# Patient Record
Sex: MALE | Race: WHITE | NOT HISPANIC OR LATINO | Employment: FULL TIME | URBAN - METROPOLITAN AREA
[De-identification: names, ages, dates, MRNs, and addresses within clinical notes are randomized per-mention and may not be internally consistent; named-entity substitution may affect disease eponyms.]

---

## 2021-11-29 ENCOUNTER — TELEPHONE (OUTPATIENT)
Dept: SCHEDULING | Facility: IMAGING CENTER | Age: 46
End: 2021-11-29

## 2022-02-11 ENCOUNTER — OFFICE VISIT (OUTPATIENT)
Dept: MEDICAL GROUP | Facility: PHYSICIAN GROUP | Age: 47
End: 2022-02-11
Payer: COMMERCIAL

## 2022-02-11 VITALS
OXYGEN SATURATION: 98 % | SYSTOLIC BLOOD PRESSURE: 112 MMHG | TEMPERATURE: 98.3 F | WEIGHT: 173 LBS | DIASTOLIC BLOOD PRESSURE: 80 MMHG | HEART RATE: 63 BPM | HEIGHT: 72 IN | RESPIRATION RATE: 18 BRPM | BODY MASS INDEX: 23.43 KG/M2

## 2022-02-11 DIAGNOSIS — Z13.1 ENCOUNTER FOR SCREENING FOR DIABETES MELLITUS: ICD-10-CM

## 2022-02-11 DIAGNOSIS — N18.31 STAGE 3A CHRONIC KIDNEY DISEASE: ICD-10-CM

## 2022-02-11 DIAGNOSIS — M54.50 CHRONIC LOW BACK PAIN, UNSPECIFIED BACK PAIN LATERALITY, UNSPECIFIED WHETHER SCIATICA PRESENT: ICD-10-CM

## 2022-02-11 DIAGNOSIS — F41.1 GAD (GENERALIZED ANXIETY DISORDER): ICD-10-CM

## 2022-02-11 DIAGNOSIS — Z13.6 SCREENING FOR CARDIOVASCULAR CONDITION: ICD-10-CM

## 2022-02-11 DIAGNOSIS — Z13.0 SCREENING FOR DEFICIENCY ANEMIA: ICD-10-CM

## 2022-02-11 DIAGNOSIS — G89.29 CHRONIC LOW BACK PAIN, UNSPECIFIED BACK PAIN LATERALITY, UNSPECIFIED WHETHER SCIATICA PRESENT: ICD-10-CM

## 2022-02-11 PROBLEM — K58.9 IBS (IRRITABLE BOWEL SYNDROME): Status: ACTIVE | Noted: 2022-02-11

## 2022-02-11 PROBLEM — N43.3 HYDROCELE: Status: ACTIVE | Noted: 2022-02-11

## 2022-02-11 PROCEDURE — 99204 OFFICE O/P NEW MOD 45 MIN: CPT | Performed by: INTERNAL MEDICINE

## 2022-02-11 RX ORDER — LORAZEPAM 1 MG/1
1 TABLET ORAL EVERY 4 HOURS PRN
COMMUNITY

## 2022-02-11 ASSESSMENT — ANXIETY QUESTIONNAIRES
7. FEELING AFRAID AS IF SOMETHING AWFUL MIGHT HAPPEN: NEARLY EVERY DAY
5. BEING SO RESTLESS THAT IT IS HARD TO SIT STILL: NEARLY EVERY DAY
4. TROUBLE RELAXING: NEARLY EVERY DAY
3. WORRYING TOO MUCH ABOUT DIFFERENT THINGS: NEARLY EVERY DAY
GAD7 TOTAL SCORE: 21
1. FEELING NERVOUS, ANXIOUS, OR ON EDGE: NEARLY EVERY DAY
6. BECOMING EASILY ANNOYED OR IRRITABLE: NEARLY EVERY DAY
2. NOT BEING ABLE TO STOP OR CONTROL WORRYING: NEARLY EVERY DAY

## 2022-02-11 ASSESSMENT — PATIENT HEALTH QUESTIONNAIRE - PHQ9: CLINICAL INTERPRETATION OF PHQ2 SCORE: 0

## 2022-02-11 NOTE — LETTER
[facility]  Misty Lauren M.D.  [address]  [place] [address]  Fax: [phone]   Authorization for Release/Disclosure of   Protected Health Information   Name: ZAIDA TORREZ : 1975 SSN: [id number]   Address: [id number] [place] [address] [place] [address] Phone:    [phone] (home)    I authorize the entity listed below to release/disclose the PHI below to:   [facility]/Misty Lauren M.D. and Misty Lauren M.D.   Provider or Entity Name:  Anabelle Shi TANIA    Address   City, State, [facility]   Phone:      Fax:     Reason for request: continuity of care   Information to be released:    [  ] LAST COLONOSCOPY,  including any PATH REPORT and follow-up  [  ] LAST FIT/COLOGUARD RESULT [  ] LAST DEXA  [  ] LAST MAMMOGRAM  [  ] LAST PAP  [  ] LAST LABS [  ] RETINA EXAM REPORT  [  ] IMMUNIZATION RECORDS  [xx  ] Release all info      [  ] Check here and initial the line next to each item to release ALL health information INCLUDING  _____ Care and treatment for drug and / or alcohol abuse  _____ HIV testing, infection status, or AIDS  _____ Genetic Testing    DATES OF SERVICE OR TIME PERIOD TO BE DISCLOSED: _____________  I understand and acknowledge that:  * This Authorization may be revoked at any time by you in writing, except if your health information has already been used or disclosed.  * Your health information that will be used or disclosed as a result of you signing this authorization could be re-disclosed by the recipient. If this occurs, your re-disclosed health information may no longer be protected by State or Federal laws.  * You may refuse to sign this Authorization. Your refusal will not affect your ability to obtain treatment.  * This Authorization becomes effective upon signing and will  on (date) __________.      If no date is indicated, this Authorization will  one (1) year from the signature date.    Name: Zaida Torrez    Signature:   Date:     2022       PLEASE  FAX REQUESTED RECORDS BACK TO: [phone]

## 2022-02-11 NOTE — PROGRESS NOTES
Subjective:     CC: Establish care    HISTORY OF THE PRESENT ILLNESS: Patient is a 46 y.o. male. This pleasant patient is here today to establish care and discuss the following issues:    The patient presents to establish care.  He generally feels well.  The patient reports a history of anxiety for which he takes a quarter of his Ativan 1-2 times per week.  He reports chronic back pain that has been improving with changes in his posture at work.  He states he had COVID-19 infection a month ago.  He had a colonoscopy last year, he thinks his next colonoscopy was due in 5 to 7 years.    Allergies: Patient has no allergy information on record.    Current Outpatient Medications Ordered in Epic   Medication Sig Dispense Refill   • LORazepam (ATIVAN) 1 MG Tab Take 1 mg by mouth every four hours as needed for Anxiety. Pt slit 1 MG into quarters       No current Epic-ordered facility-administered medications on file.       History reviewed. No pertinent past medical history.    Past Surgical History:   Procedure Laterality Date   • RHINOPLASTY  2000   • TONSILLECTOMY  1980       Social History     Tobacco Use   • Smoking status: Never Smoker   • Smokeless tobacco: Never Used   Vaping Use   • Vaping Use: Never used   Substance Use Topics   • Alcohol use: Never   • Drug use: Never       Social History     Social History Narrative   • Not on file       Family History   Problem Relation Age of Onset   • Dementia Mother    • Osteoporosis Father    • Hypertension Father        Health Maintenance: Completed    ROS:   Gen: no fevers/chills  Pulm: no sob, no cough  CV: no chest pain, no palpitations  GI: no nausea/vomiting, no diarrhea  Neuro: no headaches, no numbness/tingling      Objective:     Exam: /80   Pulse 63   Temp 36.8 °C (98.3 °F) (Temporal)   Resp 18   Ht 1.829 m (6')   Wt 78.5 kg (173 lb)   SpO2 98%  Body mass index is 23.46 kg/m².    General: Normal appearing. No distress.  HEENT: Normocephalic. Eyes  conjunctiva clear lids without ptosis, pupils equal and reactive to light accommodation, oropharynx is without erythema, edema or exudates.   Pulmonary: Clear to ausculation.  Normal effort. No rales, ronchi, or wheezing.  Cardiovascular: Regular rate and rhythm without murmur.   Abdomen: Soft, nontender, nondistended.   Musculoskeletal: No extremity cyanosis, clubbing, or edema.  Psych: Normal mood and affect. Alert and oriented x3. Judgment and insight is normal.    Assessment & Plan:   46 y.o. male with the following -    LILLIAN (generalized anxiety disorder)  This is a chronic medical condition.  Stable.  The patient takes Ativan 0.25 mg to 0.5 mg 1-2 times per week.  He is not on chronic SSRI therapy.  -Continue lorazepam 0.25 mg to 0.5 mg once or twice weekly  - LORazepam (ATIVAN) 1 MG Tab; Take 1 mg by mouth every four hours as needed for Anxiety. Pt slit 1 MG into quarters    Chronic low back pain, unspecified back pain laterality, unspecified whether sciatica present  This is a chronic medical condition.  Improving.  Patient reports improvement with improvement in his posture at work.    Stage 3a chronic kidney disease (HCC)  This is a chronic medical condition.  No recent labs available for review.  - US-RENAL; Future  - URINE MICROSCOPIC (MICROSCOPIC URINALYSIS); Future  - Cystatin C w/rflx eGFR; Future    Screening for deficiency anemia  - CBC WITH DIFFERENTIAL; Future    Encounter for screening for diabetes mellitus  - Comp Metabolic Panel; Future    Screening for cardiovascular condition  - Lipid Profile; Future      Return in about 1 year (around 2/11/2023) for Annual/Wellness Visit.    Please note that this dictation was created using voice recognition software. I have made every reasonable attempt to correct obvious errors, but I expect that there are errors of grammar and possibly content that I did not discover before finalizing the note.

## 2022-02-16 PROBLEM — N18.31 STAGE 3A CHRONIC KIDNEY DISEASE: Status: ACTIVE | Noted: 2022-02-16

## 2022-03-29 RX ORDER — SERTRALINE HYDROCHLORIDE 25 MG/1
TABLET, FILM COATED ORAL
COMMUNITY
Start: 2022-03-12 | End: 2023-03-15

## 2022-03-29 SDOH — ECONOMIC STABILITY: INCOME INSECURITY: IN THE LAST 12 MONTHS, WAS THERE A TIME WHEN YOU WERE NOT ABLE TO PAY THE MORTGAGE OR RENT ON TIME?: NO

## 2022-03-29 SDOH — ECONOMIC STABILITY: FOOD INSECURITY: WITHIN THE PAST 12 MONTHS, THE FOOD YOU BOUGHT JUST DIDN'T LAST AND YOU DIDN'T HAVE MONEY TO GET MORE.: NEVER TRUE

## 2022-03-29 SDOH — HEALTH STABILITY: PHYSICAL HEALTH: ON AVERAGE, HOW MANY DAYS PER WEEK DO YOU ENGAGE IN MODERATE TO STRENUOUS EXERCISE (LIKE A BRISK WALK)?: 6 DAYS

## 2022-03-29 SDOH — HEALTH STABILITY: PHYSICAL HEALTH: ON AVERAGE, HOW MANY MINUTES DO YOU ENGAGE IN EXERCISE AT THIS LEVEL?: 40 MIN

## 2022-03-29 SDOH — ECONOMIC STABILITY: HOUSING INSECURITY: IN THE LAST 12 MONTHS, HOW MANY PLACES HAVE YOU LIVED?: 1

## 2022-03-29 SDOH — ECONOMIC STABILITY: FOOD INSECURITY: WITHIN THE PAST 12 MONTHS, YOU WORRIED THAT YOUR FOOD WOULD RUN OUT BEFORE YOU GOT MONEY TO BUY MORE.: NEVER TRUE

## 2022-03-29 SDOH — ECONOMIC STABILITY: HOUSING INSECURITY
IN THE LAST 12 MONTHS, WAS THERE A TIME WHEN YOU DID NOT HAVE A STEADY PLACE TO SLEEP OR SLEPT IN A SHELTER (INCLUDING NOW)?: NO

## 2022-03-29 SDOH — ECONOMIC STABILITY: TRANSPORTATION INSECURITY
IN THE PAST 12 MONTHS, HAS THE LACK OF TRANSPORTATION KEPT YOU FROM MEDICAL APPOINTMENTS OR FROM GETTING MEDICATIONS?: NO

## 2022-03-29 SDOH — ECONOMIC STABILITY: TRANSPORTATION INSECURITY
IN THE PAST 12 MONTHS, HAS LACK OF RELIABLE TRANSPORTATION KEPT YOU FROM MEDICAL APPOINTMENTS, MEETINGS, WORK OR FROM GETTING THINGS NEEDED FOR DAILY LIVING?: NO

## 2022-03-29 SDOH — HEALTH STABILITY: MENTAL HEALTH
STRESS IS WHEN SOMEONE FEELS TENSE, NERVOUS, ANXIOUS, OR CAN'T SLEEP AT NIGHT BECAUSE THEIR MIND IS TROUBLED. HOW STRESSED ARE YOU?: TO SOME EXTENT

## 2022-03-29 SDOH — ECONOMIC STABILITY: INCOME INSECURITY: HOW HARD IS IT FOR YOU TO PAY FOR THE VERY BASICS LIKE FOOD, HOUSING, MEDICAL CARE, AND HEATING?: NOT HARD AT ALL

## 2022-03-29 SDOH — ECONOMIC STABILITY: TRANSPORTATION INSECURITY
IN THE PAST 12 MONTHS, HAS LACK OF TRANSPORTATION KEPT YOU FROM MEETINGS, WORK, OR FROM GETTING THINGS NEEDED FOR DAILY LIVING?: NO

## 2022-03-29 ASSESSMENT — SOCIAL DETERMINANTS OF HEALTH (SDOH)
HOW OFTEN DO YOU GET TOGETHER WITH FRIENDS OR RELATIVES?: ONCE A WEEK
HOW HARD IS IT FOR YOU TO PAY FOR THE VERY BASICS LIKE FOOD, HOUSING, MEDICAL CARE, AND HEATING?: NOT HARD AT ALL
HOW MANY DRINKS CONTAINING ALCOHOL DO YOU HAVE ON A TYPICAL DAY WHEN YOU ARE DRINKING: PATIENT DECLINED
HOW OFTEN DO YOU HAVE A DRINK CONTAINING ALCOHOL: NEVER
WITHIN THE PAST 12 MONTHS, YOU WORRIED THAT YOUR FOOD WOULD RUN OUT BEFORE YOU GOT THE MONEY TO BUY MORE: NEVER TRUE
IN A TYPICAL WEEK, HOW MANY TIMES DO YOU TALK ON THE PHONE WITH FAMILY, FRIENDS, OR NEIGHBORS?: ONCE A WEEK
HOW OFTEN DO YOU HAVE SIX OR MORE DRINKS ON ONE OCCASION: NEVER
DO YOU BELONG TO ANY CLUBS OR ORGANIZATIONS SUCH AS CHURCH GROUPS UNIONS, FRATERNAL OR ATHLETIC GROUPS, OR SCHOOL GROUPS?: YES
HOW OFTEN DO YOU ATTEND CHURCH OR RELIGIOUS SERVICES?: MORE THAN 4 TIMES PER YEAR
HOW OFTEN DO YOU ATTENT MEETINGS OF THE CLUB OR ORGANIZATION YOU BELONG TO?: MORE THAN 4 TIMES PER YEAR
HOW OFTEN DO YOU GET TOGETHER WITH FRIENDS OR RELATIVES?: ONCE A WEEK
IN A TYPICAL WEEK, HOW MANY TIMES DO YOU TALK ON THE PHONE WITH FAMILY, FRIENDS, OR NEIGHBORS?: ONCE A WEEK
HOW OFTEN DO YOU ATTEND CHURCH OR RELIGIOUS SERVICES?: MORE THAN 4 TIMES PER YEAR
HOW OFTEN DO YOU ATTENT MEETINGS OF THE CLUB OR ORGANIZATION YOU BELONG TO?: MORE THAN 4 TIMES PER YEAR
DO YOU BELONG TO ANY CLUBS OR ORGANIZATIONS SUCH AS CHURCH GROUPS UNIONS, FRATERNAL OR ATHLETIC GROUPS, OR SCHOOL GROUPS?: YES

## 2022-03-29 ASSESSMENT — LIFESTYLE VARIABLES
HOW OFTEN DO YOU HAVE SIX OR MORE DRINKS ON ONE OCCASION: NEVER
HOW MANY STANDARD DRINKS CONTAINING ALCOHOL DO YOU HAVE ON A TYPICAL DAY: PATIENT DECLINED
HOW OFTEN DO YOU HAVE A DRINK CONTAINING ALCOHOL: NEVER

## 2022-03-29 NOTE — PROGRESS NOTES
Subjective:     CC:   Chief Complaint   Patient presents with   • Medication Refill   • Annual Exam         HPI:   Carlos presents today to discuss the following issues:    Mild episode of recurrent major depressive disorder (HCC)  LILLIAN (generalized anxiety disorder)  The patient was previously on sertraline 25 mg daily and restarted the medication about 2 weeks ago for worsening depressive symptoms.  He states he was previously on it for about 1-1/2 years, had stopped it in the fall 2020.  The patient also takes lorazepam 0.25 mg to 0.5 mg 1-2 times per week.        History reviewed. No pertinent past medical history.    Social History     Tobacco Use   • Smoking status: Never Smoker   • Smokeless tobacco: Never Used   Vaping Use   • Vaping Use: Never used   Substance Use Topics   • Alcohol use: Never   • Drug use: Never       Current Outpatient Medications Ordered in Epic   Medication Sig Dispense Refill   • sertraline (ZOLOFT) 25 MG tablet Take 1 Tablet by mouth every day. 90 Tablet 3   • sertraline (ZOLOFT) 25 MG tablet      • LORazepam (ATIVAN) 1 MG Tab Take 1 mg by mouth every four hours as needed for Anxiety. Pt slit 1 MG into quarters       No current Epic-ordered facility-administered medications on file.       Allergies:  Penicillins     Health Maintenance: Completed    ROS:   Denies any recent fevers or chills. No nausea or vomiting. No chest pains or shortness of breath.      Objective:       Exam:  /70 (BP Location: Left arm, Patient Position: Sitting, BP Cuff Size: Adult)   Pulse (!) 57   Temp 36.6 °C (97.8 °F) (Temporal)   Resp 20   Ht 1.829 m (6')   Wt 77.6 kg (171 lb)   SpO2 97%   BMI 23.19 kg/m²  Body mass index is 23.19 kg/m².    Gen: Alert and oriented, No apparent distress.  Lungs: Normal effort, CTA bilaterally, no wheezes, rhonchi, or rales  CV: Regular rate and rhythm. No murmurs, rubs, or gallops.  Ext: No clubbing, cyanosis, edema.    Outside labs 2/21/2022:  WBC 3.4, hemoglobin  14.4, hematocrit 41.4, platelets 221, normal differential  Potassium 4.5 chloride 102, calcium 9.5, total protein 7.8, albumin 4.7, total bilirubin 1.3, AST 36, ALT 20, alk phos 54, creatinine 1.2, BUN 14, glucose 98  Total cholesterol 188, , HDL 63, triglycerides 62    Assessment & Plan:     47 y.o. male with the following -     Mild episode of recurrent major depressive disorder (HCC)  LILLIAN (generalized anxiety disorder)  This is a chronic medical condition.  Stable.    The patient was previously on sertraline 25 mg daily and restarted the medication about 2 weeks ago for worsening depressive symptoms.  He states he was previously on it for about 1-1/2 years, had stopped it in the fall 2020.  The patient takes lorazepam 0.25 mg to 0.5 mg 1-2 times per week.    -Continue sertraline 25 mg daily, patient states he is only taking half tablet daily  - sertraline (ZOLOFT) 25 MG tablet; Take 1 Tablet by mouth every day.  Dispense: 90 Tablet; Refill: 3  -Continue lorazepam 0.25 mg to 0.5 mg once or twice weekly    Stage 3a chronic kidney disease (HCC)  This is a chronic medical condition.  Improved/resolved.  Labs from 2/21/2022 showed a normal GFR by cystatin-C of 92, and a normal GFR by creatinine clearance of 75.  Renal ultrasound on 3/30/2022 was normal.  No evidence of double ureter present.  - URINE CYTOLOGY    Screening for deficiency anemia  - CBC WITH DIFFERENTIAL; Future     Encounter for screening for diabetes mellitus  - Comp Metabolic Panel; Future     Screening for cardiovascular condition  - Lipid Profile; Future    Return in about 1 year (around 3/30/2023) for Annual/Wellness Visit.    Please note that this dictation was created using voice recognition software. I have made every reasonable attempt to correct obvious errors, but I expect that there are errors of grammar and possibly content that I did not discover before finalizing the note.

## 2022-03-30 ENCOUNTER — HOSPITAL ENCOUNTER (OUTPATIENT)
Dept: RADIOLOGY | Facility: MEDICAL CENTER | Age: 47
End: 2022-03-30
Attending: INTERNAL MEDICINE
Payer: COMMERCIAL

## 2022-03-30 ENCOUNTER — OFFICE VISIT (OUTPATIENT)
Dept: MEDICAL GROUP | Facility: PHYSICIAN GROUP | Age: 47
End: 2022-03-30
Payer: COMMERCIAL

## 2022-03-30 VITALS
WEIGHT: 171 LBS | BODY MASS INDEX: 23.16 KG/M2 | TEMPERATURE: 97.8 F | HEART RATE: 57 BPM | OXYGEN SATURATION: 97 % | DIASTOLIC BLOOD PRESSURE: 70 MMHG | SYSTOLIC BLOOD PRESSURE: 108 MMHG | HEIGHT: 72 IN | RESPIRATION RATE: 20 BRPM

## 2022-03-30 DIAGNOSIS — Z13.6 SCREENING FOR CARDIOVASCULAR CONDITION: ICD-10-CM

## 2022-03-30 DIAGNOSIS — Z13.1 ENCOUNTER FOR SCREENING FOR DIABETES MELLITUS: ICD-10-CM

## 2022-03-30 DIAGNOSIS — Z13.0 SCREENING FOR DEFICIENCY ANEMIA: ICD-10-CM

## 2022-03-30 DIAGNOSIS — F41.1 GAD (GENERALIZED ANXIETY DISORDER): ICD-10-CM

## 2022-03-30 DIAGNOSIS — N18.31 STAGE 3A CHRONIC KIDNEY DISEASE: ICD-10-CM

## 2022-03-30 DIAGNOSIS — F33.0 MILD EPISODE OF RECURRENT MAJOR DEPRESSIVE DISORDER (HCC): ICD-10-CM

## 2022-03-30 PROBLEM — F32.A DEPRESSION: Status: ACTIVE | Noted: 2022-03-30

## 2022-03-30 PROCEDURE — 99214 OFFICE O/P EST MOD 30 MIN: CPT | Performed by: INTERNAL MEDICINE

## 2022-03-30 PROCEDURE — 76775 US EXAM ABDO BACK WALL LIM: CPT

## 2022-03-30 RX ORDER — SERTRALINE HYDROCHLORIDE 25 MG/1
25 TABLET, FILM COATED ORAL DAILY
Qty: 90 TABLET | Refills: 3 | Status: SHIPPED | OUTPATIENT
Start: 2022-03-30 | End: 2023-03-15 | Stop reason: SDUPTHER

## 2022-10-03 ENCOUNTER — OFFICE VISIT (OUTPATIENT)
Dept: MEDICAL GROUP | Facility: PHYSICIAN GROUP | Age: 47
End: 2022-10-03
Payer: COMMERCIAL

## 2022-10-03 VITALS
OXYGEN SATURATION: 97 % | SYSTOLIC BLOOD PRESSURE: 110 MMHG | RESPIRATION RATE: 16 BRPM | HEIGHT: 72 IN | DIASTOLIC BLOOD PRESSURE: 66 MMHG | TEMPERATURE: 98.3 F | WEIGHT: 176.2 LBS | HEART RATE: 52 BPM | BODY MASS INDEX: 23.86 KG/M2

## 2022-10-03 DIAGNOSIS — H61.23 BILATERAL IMPACTED CERUMEN: Primary | ICD-10-CM

## 2022-10-03 PROCEDURE — 69210 REMOVE IMPACTED EAR WAX UNI: CPT | Mod: 50 | Performed by: NURSE PRACTITIONER

## 2022-10-03 NOTE — ASSESSMENT & PLAN NOTE
Chronic and ongoing. Patient reports that he has muffled hearing and would like his ears check for cerumen impaction. He states that he has plans to get hearing aids and wants to make sure that his ears are clear.  On examination today, patient does have bilateral cerumen impaction.     Procedure: Cerumen Removal  Risks and benefits of procedure discussed with patient.  Cerumen removed with  lavage   Patient tolerated the procedure well  Pt educated about proper care of ear canal. Q-tip cleaning discouraged, use of debrox and warm water lavage discussed.  Post lavage curette performed by provider, Post procedure exam with clear canal and normal TM.

## 2022-10-04 NOTE — PROGRESS NOTES
Subjective:     CC: The encounter diagnosis was Bilateral impacted cerumen.      HPI:   Carlos is an established patient of mine here for follow-up.  We discussed the following problems:    1. Bilateral impacted cerumen  Chronic and ongoing. Patient is here for evaluation today.       History reviewed. No pertinent past medical history.    Social History     Tobacco Use    Smoking status: Never    Smokeless tobacco: Never   Vaping Use    Vaping Use: Never used   Substance Use Topics    Alcohol use: Never    Drug use: Never       Current Outpatient Medications Ordered in Epic   Medication Sig Dispense Refill    sertraline (ZOLOFT) 25 MG tablet Take 1 Tablet by mouth every day. 90 Tablet 3    sertraline (ZOLOFT) 25 MG tablet       LORazepam (ATIVAN) 1 MG Tab Take 1 mg by mouth every four hours as needed for Anxiety. Pt slit 1 MG into quarters       No current Epic-ordered facility-administered medications on file.       Allergies:  Penicillins    Health Maintenance: Deferred    ROS:  Gen: no fevers/chills, no changes in weight  Eyes: no changes in vision  ENT: no sore throat, no hearing loss, no bloody nose  Pulm: no sob, no cough  CV: no chest pain, no palpitations  GI: no nausea/vomiting, no diarrhea  : no dysuria  MSk: no myalgias  Skin: no rash  Neuro: no headaches, no numbness/tingling  Heme/Lymph: no easy bruising      Objective:       Exam:  /66 (BP Location: Right arm, Patient Position: Sitting, BP Cuff Size: Adult)   Pulse (!) 52   Temp 36.8 °C (98.3 °F) (Temporal)   Resp 16   Ht 1.829 m (6')   Wt 79.9 kg (176 lb 3.2 oz)   SpO2 97%   BMI 23.90 kg/m²  Body mass index is 23.9 kg/m².    Gen: Alert and oriented, No apparent distress.  Neck: Neck is supple without lymphadenopathy.  Ext: No clubbing, cyanosis, edema.    Labs: Dated: None    Assessment & Plan:     47 y.o. male with the following -     Bilateral impacted cerumen  Chronic and ongoing. Patient reports that he has muffled hearing and would  like his ears check for cerumen impaction. He states that he has plans to get hearing aids and wants to make sure that his ears are clear.  On examination today, patient does have bilateral cerumen impaction.     Procedure: Cerumen Removal  Risks and benefits of procedure discussed with patient.  Cerumen removed with  lavage   Patient tolerated the procedure well  Pt educated about proper care of ear canal. Q-tip cleaning discouraged, use of debrox and warm water lavage discussed.  Post lavage curette performed by provider, Post procedure exam with clear canal and normal TM.        No orders of the defined types were placed in this encounter.         Return if symptoms worsen or fail to improve.    I have placed the below orders and discussed them with an approved delegating provider.  The MA is performing the below orders under the direction of Misty Lauren MD.    Please note that this dictation was created using voice recognition software. I have made every reasonable attempt to correct obvious errors, but I expect that there are errors of grammar and possibly content that I did not discover before finalizing the note.

## 2023-02-06 ENCOUNTER — TELEPHONE (OUTPATIENT)
Dept: HEALTH INFORMATION MANAGEMENT | Facility: OTHER | Age: 48
End: 2023-02-06
Payer: COMMERCIAL

## 2023-02-06 DIAGNOSIS — F41.1 GAD (GENERALIZED ANXIETY DISORDER): ICD-10-CM

## 2023-02-06 NOTE — TELEPHONE ENCOUNTER
1. Caller Name: Carlos Holt                          Call Back Number: [phone] (home)         How would the patient prefer to be contacted with a response: [employer] message    2. SPECIFIC Action To Be Taken: Orders pending, please sign.    3. Diagnosis/Clinical Reason for Request: THERAPY    4. Specialty & Provider Name/Lab/Imaging Location: psychiatrist (INSURANCE APPROVED)    5. Is appointment scheduled for requested order/referral: no    Patient was not informed they will receive a return phone call from the office ONLY if there are any questions before processing their request. Advised to call back if they haven't received a call from the referral department in 5 days.

## 2023-03-15 ENCOUNTER — OFFICE VISIT (OUTPATIENT)
Dept: MEDICAL GROUP | Facility: PHYSICIAN GROUP | Age: 48
End: 2023-03-15
Payer: COMMERCIAL

## 2023-03-15 VITALS
DIASTOLIC BLOOD PRESSURE: 64 MMHG | TEMPERATURE: 97.4 F | HEART RATE: 53 BPM | BODY MASS INDEX: 23.03 KG/M2 | HEIGHT: 72 IN | SYSTOLIC BLOOD PRESSURE: 108 MMHG | OXYGEN SATURATION: 98 % | WEIGHT: 170 LBS

## 2023-03-15 DIAGNOSIS — Z00.00 WELLNESS EXAMINATION: ICD-10-CM

## 2023-03-15 DIAGNOSIS — F33.1 MODERATE EPISODE OF RECURRENT MAJOR DEPRESSIVE DISORDER (HCC): ICD-10-CM

## 2023-03-15 DIAGNOSIS — Z11.59 NEED FOR HEPATITIS C SCREENING TEST: ICD-10-CM

## 2023-03-15 DIAGNOSIS — F41.1 GAD (GENERALIZED ANXIETY DISORDER): ICD-10-CM

## 2023-03-15 DIAGNOSIS — Z11.59 NEED FOR HEPATITIS B SCREENING TEST: ICD-10-CM

## 2023-03-15 PROCEDURE — 99396 PREV VISIT EST AGE 40-64: CPT | Performed by: INTERNAL MEDICINE

## 2023-03-15 RX ORDER — CYCLOBENZAPRINE HCL 10 MG
TABLET ORAL
COMMUNITY
End: 2023-10-25

## 2023-03-15 RX ORDER — LAMOTRIGINE 25 MG/1
25 TABLET ORAL DAILY
Qty: 90 TABLET | Refills: 3 | Status: SHIPPED | OUTPATIENT
Start: 2023-03-15 | End: 2023-04-26

## 2023-03-15 RX ORDER — SERTRALINE HYDROCHLORIDE 25 MG/1
25 TABLET, FILM COATED ORAL DAILY
Qty: 90 TABLET | Refills: 3 | Status: SHIPPED | OUTPATIENT
Start: 2023-03-15 | End: 2023-11-13 | Stop reason: SDUPTHER

## 2023-03-15 ASSESSMENT — PATIENT HEALTH QUESTIONNAIRE - PHQ9
5. POOR APPETITE OR OVEREATING: 3 - NEARLY EVERY DAY
CLINICAL INTERPRETATION OF PHQ2 SCORE: 6

## 2023-03-15 NOTE — PROGRESS NOTES
Subjective:     CC:   Chief Complaint   Patient presents with    Annual Exam         HPI:   Carlos Holt is a 48-year-old male who presents for annual wellness exam. The patient feels well and denies any acute medical complaints.  He reports his diet is moderately healthy and he exercises 6 days/week.  He denies alcohol or illicit drug use.  The patient's past medical and surgical history, medications, allergies, and family history were reviewed.  The patient is up to date on labs, immunizations, and other preventative care.       History reviewed. No pertinent past medical history.    Social History     Tobacco Use    Smoking status: Never    Smokeless tobacco: Never   Vaping Use    Vaping Use: Never used   Substance Use Topics    Alcohol use: Never    Drug use: Never       Current Outpatient Medications Ordered in Epic   Medication Sig Dispense Refill    cyclobenzaprine (FLEXERIL) 10 mg Tab cyclobenzaprine 10 mg tablet   TAKE 1 TABLET BY MOUTH EVERY DAY      sertraline (ZOLOFT) 25 MG tablet Take 1 Tablet by mouth every day. 90 Tablet 3    lamoTRIgine (LAMICTAL) 25 MG Tab Take 1 Tablet by mouth every day. 90 Tablet 3    LORazepam (ATIVAN) 1 MG Tab Take 1 mg by mouth every four hours as needed for Anxiety. Pt slit 1 MG into quarters       No current Epic-ordered facility-administered medications on file.       Allergies:  Penicillins    Health Maintenance: Completed    Review of Systems:  Constitutional: Negative for fever, chills.  Respiratory: Negative for cough and shortness of breath.    Cardiovascular: Negative for chest pain or palpitations.  Gastrointestinal: Negative for abdominal pain, nausea, vomiting, and diarrhea.   Neurological: Negative for headaches, numbness, or tingling.  Psychiatric: Negative for anxiety or depression.      Objective:       Vitals:  /64 (BP Location: Left arm, Patient Position: Sitting, BP Cuff Size: Adult)   Pulse (!) 53   Temp 36.3 °C (97.4 °F) (Temporal)   Ht 1.829 m (6')    Wt 77.1 kg (170 lb)   SpO2 98%   BMI 23.06 kg/m²  Body mass index is 23.06 kg/m².    Physical Exam:  Constitutional: Well-developed, no acute distress.  HEENT: Pupils are equal, round, and reactive to light. Oropharynx is without erythema, edema or exudates.   Neck: No thyromegaly or palpable thyroid nodules. No cervical or supraclavicular lymphadenopathy noted.  Lungs: Clear to auscultation bilaterally. No wheezes, rhonchi, or rales.   Cardiovascular: Regular rate and rhythm, no murmurs noted.   Abdomen: Soft, nontender, nondistended.   Extremities: No edema or erythema.  Psychiatric:  Behavior, mood, and affect are appropriate.    Assessment & Plan:     48 y.o. male with the following -     Wellness examination  The patient feels well and denies any complaints. There are no concerning signs and/or symptoms of any significant disease process.  Normal exam findings.  The patient was counseled about regular exercise, healthy diet, abstinence from smoking, drugs, and excessive alcohol.   - CBC WITH DIFFERENTIAL; Future  - Comp Metabolic Panel; Future  - Lipid Profile; Future    Moderate episode of recurrent major depressive disorder (HCC)  LILLIAN (generalized anxiety disorder)  Chronic medical condition.  Stable.  The patient is currently taking sertraline 25 mg daily.  He and his wife are going to marital counseling.  He states he is likely to be laid off as the [employer] is closing.  He reports his sister takes lamotrigine and wonders if he could benefit from this medication in addition to his current regimen.  -Continue sertraline 25 mg daily and start low-dose lamotrigine 25 mg daily for mood stabilization  - lamoTRIgine (LAMICTAL) 25 MG Tab; Take 1 Tablet by mouth every day.  Dispense: 90 Tablet; Refill: 3  - sertraline (ZOLOFT) 25 MG tablet; Take 1 Tablet by mouth every day.  Dispense: 90 Tablet; Refill: 3    Need for hepatitis C screening test  - HEP C VIRUS ANTIBODY; Future    Need for hepatitis B screening  test  - HEP B SURFACE AB; Future    HCM: Completed  Labs per orders  Immunizations per orders  The patient was counseled about regular exercise, healthy diet, abstinence from smoking, drugs, and excessive alcohol.     Return in about 4 weeks (around 4/12/2023) for new antidepressant.    Please note that this dictation was created using voice recognition software. I have made every reasonable attempt to correct obvious errors, but I expect that there are errors of grammar and possibly content that I did not discover before finalizing the note.

## 2023-03-19 ASSESSMENT — PATIENT HEALTH QUESTIONNAIRE - PHQ9: SUM OF ALL RESPONSES TO PHQ QUESTIONS 1-9: 22

## 2023-04-24 NOTE — PROGRESS NOTES
Virtual Visit: Established Patient   This visit was conducted via Zoom using secure and encrypted videoconferencing technology. The patient was in a private location in the state of [place].    The patient's identity was confirmed and verbal consent was obtained for this virtual visit.    Subjective:   CC:   Chief Complaint   Patient presents with    Follow-Up     lamoTRIgine (LAMICTAL) 25 MG Tab       Carlos Holt is a 48 y.o. male presenting for evaluation and management of his depression and anxiety. At the patient's last visit he was started on lamotrigine 25 mg daily in addition to his sertraline 25 mg daily for worsening anxiety and depression.  He reports an improvement in his symptoms with the lamotrigine.  He does feel there is room for improvement and would like to continue to go up on his lamotrigine dose.  His sister is on the same medication.     ROS   Denies any recent fevers or chills. No nausea or vomiting. No chest pains or shortness of breath.     Allergies   Allergen Reactions    Penicillins Rash     Rash        Current medicines (including changes today)  Current Outpatient Medications   Medication Sig Dispense Refill    LamoTRIgine 50 MG TABLET SR 24 HR Take 100 mg by mouth every day. 180 Tablet 3    cyclobenzaprine (FLEXERIL) 10 mg Tab cyclobenzaprine 10 mg tablet   TAKE 1 TABLET BY MOUTH EVERY DAY      sertraline (ZOLOFT) 25 MG tablet Take 1 Tablet by mouth every day. 90 Tablet 3    LORazepam (ATIVAN) 1 MG Tab Take 1 mg by mouth every four hours as needed for Anxiety. Pt slit 1 MG into quarters       No current facility-administered medications for this visit.       Patient Active Problem List    Diagnosis Date Noted    Bilateral impacted cerumen 10/03/2022    MDD (major depressive disorder) 03/30/2022    Stage 3a chronic kidney disease (HCC) 02/16/2022    LILLIAN (generalized anxiety disorder) 02/11/2022    Chronic lower back pain 02/11/2022    Hydrocele 02/11/2022    IBS (irritable bowel  syndrome) 02/11/2022       Family History   Problem Relation Age of Onset    Dementia Mother     Osteoporosis Father     Hypertension Father        He  has no past medical history on file.  He  has a past surgical history that includes tonsillectomy (1980) and rhinoplasty (2000).       Objective:   Ht 1.829 m (6')   Wt 77.1 kg (170 lb)   BMI 23.06 kg/m²     Physical Exam:  Constitutional: Alert, no distress, well-groomed.  Skin: No rashes in visible areas.  Eye: Round. Conjunctiva clear, lids normal. No icterus.   ENMT: Lips pink without lesions, good dentition, moist mucous membranes. Phonation normal.  Neck: No masses, no thyromegaly. Moves freely without pain.  Respiratory: Unlabored respiratory effort, no cough or audible wheeze  Psych: Alert and oriented x3, normal affect and mood.       Assessment and Plan:   The following treatment plan was discussed:     Moderate episode of recurrent major depressive disorder (HCC)  LILLIAN (generalized anxiety disorder)  Chronic medical conditions, improving.  At the patient's last visit he was started on lamotrigine 25 mg daily in addition to his sertraline 25 mg daily for worsening anxiety and depression.  He reports an improvement in his symptoms with the lamotrigine.  He does feel there is room for improvement and would like to continue to go up on his lamotrigine dose.  His sister is on the same medication.   -Continue sertraline 25 mg daily and increase lamotrigine from 25 mg nightly to 50 mg nightly and further increasing to 100 mg nightly if needed for symptom management  - LamoTRIgine 50 MG TABLET SR 24 HR; Take 100 mg by mouth every day.  Dispense: 180 Tablet; Refill: 3      Follow-up: Return in about 3 months (around 7/26/2023) for Medication.    Please note that this dictation was created using voice recognition software. I have made every reasonable attempt to correct obvious errors, but I expect that there are errors of grammar and possibly content that I did  not discover before finalizing the note.

## 2023-04-26 ENCOUNTER — TELEMEDICINE (OUTPATIENT)
Dept: MEDICAL GROUP | Facility: PHYSICIAN GROUP | Age: 48
End: 2023-04-26
Payer: COMMERCIAL

## 2023-04-26 VITALS — BODY MASS INDEX: 23.03 KG/M2 | HEIGHT: 72 IN | WEIGHT: 170 LBS

## 2023-04-26 DIAGNOSIS — F41.1 GAD (GENERALIZED ANXIETY DISORDER): ICD-10-CM

## 2023-04-26 DIAGNOSIS — F33.1 MODERATE EPISODE OF RECURRENT MAJOR DEPRESSIVE DISORDER (HCC): ICD-10-CM

## 2023-04-26 PROCEDURE — 99214 OFFICE O/P EST MOD 30 MIN: CPT | Mod: 95 | Performed by: INTERNAL MEDICINE

## 2023-04-26 RX ORDER — LAMOTRIGINE 50 MG/1
100 TABLET, EXTENDED RELEASE ORAL DAILY
Qty: 180 TABLET | Refills: 3 | Status: SHIPPED | OUTPATIENT
Start: 2023-04-26 | End: 2024-03-04 | Stop reason: SDUPTHER

## 2023-05-27 PROBLEM — F32.9 MDD (MAJOR DEPRESSIVE DISORDER): Status: ACTIVE | Noted: 2022-03-30

## 2023-08-11 PROBLEM — M54.32 NEURALGIA OF LEFT SCIATIC NERVE: Status: ACTIVE | Noted: 2022-12-08

## 2023-10-25 ENCOUNTER — TELEMEDICINE (OUTPATIENT)
Dept: MEDICAL GROUP | Facility: PHYSICIAN GROUP | Age: 48
End: 2023-10-25
Payer: COMMERCIAL

## 2023-10-25 VITALS — WEIGHT: 167 LBS | HEIGHT: 72 IN | BODY MASS INDEX: 22.62 KG/M2

## 2023-10-25 DIAGNOSIS — F33.1 MODERATE EPISODE OF RECURRENT MAJOR DEPRESSIVE DISORDER (HCC): ICD-10-CM

## 2023-10-25 DIAGNOSIS — F41.1 GAD (GENERALIZED ANXIETY DISORDER): ICD-10-CM

## 2023-10-25 PROCEDURE — 99214 OFFICE O/P EST MOD 30 MIN: CPT | Performed by: INTERNAL MEDICINE

## 2023-10-25 RX ORDER — SERTRALINE HYDROCHLORIDE 25 MG/1
1 TABLET, FILM COATED ORAL
COMMUNITY
End: 2023-10-30

## 2023-10-25 RX ORDER — LORAZEPAM 1 MG/1
1 TABLET ORAL EVERY 8 HOURS PRN
Qty: 30 TABLET | Refills: 0 | Status: SHIPPED | OUTPATIENT
Start: 2023-10-25 | End: 2023-11-24

## 2023-10-25 NOTE — PROGRESS NOTES
Virtual Visit: Established Patient   This visit was conducted via Zoom using secure and encrypted videoconferencing technology. The patient was in a private location in the state of [place].    The patient's identity was confirmed and verbal consent was obtained for this virtual visit.    Subjective:   CC:   Chief Complaint   Patient presents with    Medication Management     stronger antidepressant       Carlos Jean Holt is a 48 y.o. male presenting for evaluation and management of his depression and anxiety.  The patient states things have not been going well and his marriage and he just filed for divorce.  He has been having difficulty hiding his depression and anxiety from his four children.  He has no thoughts of self-harm.        ROS   Denies any recent fevers or chills. No nausea or vomiting. No chest pains or shortness of breath.     Allergies   Allergen Reactions    Penicillins Rash     Rash        Current medicines (including changes today)  Current Outpatient Medications   Medication Sig Dispense Refill    LORazepam (ATIVAN) 1 MG Tab Take 1 Tablet by mouth every 8 hours as needed for Anxiety for up to 30 days. 30 Tablet 0    LamoTRIgine 50 MG TABLET SR 24 HR Take 100 mg by mouth every day. 180 Tablet 3    sertraline (ZOLOFT) 25 MG tablet Take 1 Tablet by mouth every day. 90 Tablet 3    LORazepam (ATIVAN) 1 MG Tab Take 1 mg by mouth every four hours as needed for Anxiety. Pt slit 1 MG into quarters       No current facility-administered medications for this visit.       Patient Active Problem List    Diagnosis Date Noted    Neuralgia of left sciatic nerve 12/08/2022    Bilateral impacted cerumen 10/03/2022    MDD (major depressive disorder) 03/30/2022    Stage 3a chronic kidney disease (HCC) 02/16/2022    LILLIAN (generalized anxiety disorder) 02/11/2022    Chronic lower back pain 02/11/2022    Hydrocele 02/11/2022    IBS (irritable bowel syndrome) 02/11/2022       Family History   Problem Relation Age of  Onset    Dementia Mother     Osteoporosis Father     Hypertension Father        He  has no past medical history on file.  He  has a past surgical history that includes tonsillectomy (1980) and rhinoplasty (2000).       Objective:   Ht 1.829 m (6')   Wt 75.8 kg (167 lb)   BMI 22.65 kg/m²     Physical Exam:  Constitutional: Alert, no distress, well-groomed.  Skin: No rashes in visible areas.  Eye: Round. Conjunctiva clear, lids normal. No icterus.   ENMT: Lips pink without lesions, good dentition, moist mucous membranes. Phonation normal.  Neck: No masses, no thyromegaly. Moves freely without pain.  Respiratory: Unlabored respiratory effort, no cough or audible wheeze  Psych: Alert and oriented x3, normal affect and mood.       Assessment and Plan:   The following treatment plan was discussed:     Moderate episode of recurrent major depressive disorder (HCC)  LILLIAN (generalized anxiety disorder)  Chronic medical conditions, progressive.  The patient is currently taking sertraline 25 mg daily and lamotrigine 100 mg daily for his depression and anxiety.  This has been working well for him, but he is now reporting worsening depression and anxiety.  He states things have not been going well in his marriage and he recently filed for divorce.  He has been having difficulty preventing his sadness from showing in front of his four children.  He wonders if he could benefit from a medication adjustment.  He has no thoughts of self-harm.  -Advised patient to increase his sertraline from 25 mg daily to 50 mg daily and we can further increase his dose to 100 mg daily if indicated by symptoms, patient will continue lamotrigine 100 mg daily, and lorazepam 1 mg every 8 hours as needed for acute episodes of anxiety, though patient does not commonly take a full 1 mg tablet  - LORazepam (ATIVAN) 1 MG Tab; Take 1 Tablet by mouth every 8 hours as needed for Anxiety for up to 30 days.  Dispense: 30 Tablet; Refill: 0    Follow-up: Return in  about 3 months (around 1/25/2024) for 3-month f/u visit.    Please note that this dictation was created using voice recognition software. I have made every reasonable attempt to correct obvious errors, but I expect that there are errors of grammar and possibly content that I did not discover before finalizing the note.

## 2023-11-13 DIAGNOSIS — F33.1 MODERATE EPISODE OF RECURRENT MAJOR DEPRESSIVE DISORDER (HCC): ICD-10-CM

## 2023-11-20 ENCOUNTER — APPOINTMENT (RX ONLY)
Dept: URBAN - METROPOLITAN AREA CLINIC 6 | Facility: CLINIC | Age: 48
Setting detail: DERMATOLOGY
End: 2023-11-20

## 2023-11-20 DIAGNOSIS — L65.8 OTHER SPECIFIED NONSCARRING HAIR LOSS: ICD-10-CM

## 2023-11-20 DIAGNOSIS — L73.8 OTHER SPECIFIED FOLLICULAR DISORDERS: ICD-10-CM

## 2023-11-20 DIAGNOSIS — Z71.89 OTHER SPECIFIED COUNSELING: ICD-10-CM

## 2023-11-20 DIAGNOSIS — L81.4 OTHER MELANIN HYPERPIGMENTATION: ICD-10-CM

## 2023-11-20 DIAGNOSIS — D22 MELANOCYTIC NEVI: ICD-10-CM

## 2023-11-20 DIAGNOSIS — L82.1 OTHER SEBORRHEIC KERATOSIS: ICD-10-CM

## 2023-11-20 DIAGNOSIS — D18.0 HEMANGIOMA: ICD-10-CM

## 2023-11-20 PROBLEM — D22.5 MELANOCYTIC NEVI OF TRUNK: Status: ACTIVE | Noted: 2023-11-20

## 2023-11-20 PROBLEM — D22.61 MELANOCYTIC NEVI OF RIGHT UPPER LIMB, INCLUDING SHOULDER: Status: ACTIVE | Noted: 2023-11-20

## 2023-11-20 PROBLEM — D22.72 MELANOCYTIC NEVI OF LEFT LOWER LIMB, INCLUDING HIP: Status: ACTIVE | Noted: 2023-11-20

## 2023-11-20 PROBLEM — D22.62 MELANOCYTIC NEVI OF LEFT UPPER LIMB, INCLUDING SHOULDER: Status: ACTIVE | Noted: 2023-11-20

## 2023-11-20 PROBLEM — D18.01 HEMANGIOMA OF SKIN AND SUBCUTANEOUS TISSUE: Status: ACTIVE | Noted: 2023-11-20

## 2023-11-20 PROBLEM — D22.71 MELANOCYTIC NEVI OF RIGHT LOWER LIMB, INCLUDING HIP: Status: ACTIVE | Noted: 2023-11-20

## 2023-11-20 PROCEDURE — ? COSMETIC EXTRACTIONS

## 2023-11-20 PROCEDURE — 99203 OFFICE O/P NEW LOW 30 MIN: CPT

## 2023-11-20 PROCEDURE — ? PRESCRIPTION

## 2023-11-20 PROCEDURE — ? COUNSELING

## 2023-11-20 RX ORDER — BIMATOPROST 0.3 MG/ML
1 SOLUTION/ DROPS OPHTHALMIC QD
Qty: 5 | Refills: 2 | Status: ERX | COMMUNITY
Start: 2023-11-20

## 2023-11-20 RX ADMIN — BIMATOPROST 1: 0.3 SOLUTION/ DROPS OPHTHALMIC at 00:00

## 2023-11-20 ASSESSMENT — LOCATION SIMPLE DESCRIPTION DERM
LOCATION SIMPLE: LEFT UPPER ARM
LOCATION SIMPLE: RIGHT UPPER ARM
LOCATION SIMPLE: RIGHT THIGH
LOCATION SIMPLE: RIGHT FOREARM
LOCATION SIMPLE: RIGHT FOREHEAD
LOCATION SIMPLE: LEFT UPPER BACK
LOCATION SIMPLE: LEFT CALF
LOCATION SIMPLE: RIGHT CALF
LOCATION SIMPLE: LEFT THIGH
LOCATION SIMPLE: CHEST
LOCATION SIMPLE: RIGHT POSTERIOR THIGH
LOCATION SIMPLE: RIGHT LOWER BACK
LOCATION SIMPLE: LEFT CHEEK
LOCATION SIMPLE: LEFT POSTERIOR THIGH
LOCATION SIMPLE: LEFT EYEBROW
LOCATION SIMPLE: LEFT FOREARM
LOCATION SIMPLE: RIGHT UPPER BACK

## 2023-11-20 ASSESSMENT — LOCATION ZONE DERM
LOCATION ZONE: LEG
LOCATION ZONE: ARM
LOCATION ZONE: TRUNK
LOCATION ZONE: FACE

## 2023-11-20 ASSESSMENT — LOCATION DETAILED DESCRIPTION DERM
LOCATION DETAILED: RIGHT SUPERIOR LATERAL UPPER BACK
LOCATION DETAILED: RIGHT PROXIMAL DORSAL FOREARM
LOCATION DETAILED: LEFT PROXIMAL DORSAL FOREARM
LOCATION DETAILED: RIGHT INFERIOR FOREHEAD
LOCATION DETAILED: LEFT CENTRAL EYEBROW
LOCATION DETAILED: LEFT ANTERIOR DISTAL UPPER ARM
LOCATION DETAILED: RIGHT SUPERIOR LATERAL MIDBACK
LOCATION DETAILED: RIGHT ANTERIOR PROXIMAL THIGH
LOCATION DETAILED: RIGHT ANTERIOR DISTAL UPPER ARM
LOCATION DETAILED: LEFT ANTERIOR PROXIMAL THIGH
LOCATION DETAILED: RIGHT PROXIMAL CALF
LOCATION DETAILED: RIGHT MEDIAL INFERIOR CHEST
LOCATION DETAILED: RIGHT MID-UPPER BACK
LOCATION DETAILED: RIGHT DISTAL POSTERIOR THIGH
LOCATION DETAILED: LEFT VENTRAL DISTAL FOREARM
LOCATION DETAILED: RIGHT VENTRAL DISTAL FOREARM
LOCATION DETAILED: RIGHT VENTRAL PROXIMAL FOREARM
LOCATION DETAILED: LEFT PROXIMAL CALF
LOCATION DETAILED: LEFT DISTAL POSTERIOR THIGH
LOCATION DETAILED: LEFT CENTRAL MALAR CHEEK
LOCATION DETAILED: LEFT INFERIOR UPPER BACK

## 2023-11-20 NOTE — PROCEDURE: COSMETIC EXTRACTIONS
Anesthesia Volume In Cc: 0
Consent: The patient's consent was obtained including but not limited to risks of crusting, scabbing, blistering, scarring, darker or lighter pigmentary change, recurrence, incomplete removal and infection.
Post-Care Instructions: I reviewed with the patient in detail post-care instructions. Patient is to wear sunprotection, and avoid picking at any of the treated lesions. Pt may apply Vaseline to crusted or scabbing areas.
Detail Level: Detailed
Anesthesia Type: 1% lidocaine without epinephrine
Render The Number Of Extractions: Yes

## 2024-03-04 DIAGNOSIS — F41.1 GAD (GENERALIZED ANXIETY DISORDER): ICD-10-CM

## 2024-03-04 DIAGNOSIS — F33.1 MODERATE EPISODE OF RECURRENT MAJOR DEPRESSIVE DISORDER (HCC): ICD-10-CM

## 2024-03-04 RX ORDER — LAMOTRIGINE 50 MG/1
100 TABLET, EXTENDED RELEASE ORAL DAILY
Qty: 180 TABLET | Refills: 0 | Status: SHIPPED | OUTPATIENT
Start: 2024-03-04

## 2024-03-04 NOTE — TELEPHONE ENCOUNTER
Received request via: Pharmacy    Was the patient seen in the last year in this department? Yes    Does the patient have an active prescription (recently filled or refills available) for medication(s) requested? No    [facility]    Does the patient have [facility] and need 100 day supply (blood pressure, diabetes and cholesterol meds only)? Patient does not have SCP

## 2024-04-12 DIAGNOSIS — Z00.00 WELLNESS EXAMINATION: ICD-10-CM

## 2024-04-25 LAB
ALBUMIN SERPL-MCNC: 4.9 G/DL (ref 4.1–5.1)
ALBUMIN/GLOB SERPL: 2.7 {RATIO} (ref 1.2–2.2)
ALP SERPL-CCNC: 66 IU/L (ref 44–121)
ALT SERPL-CCNC: 12 IU/L (ref 0–44)
APPEARANCE UR: CLEAR
AST SERPL-CCNC: 22 IU/L (ref 0–40)
BACTERIA #/AREA URNS HPF: NORMAL /[HPF]
BASOPHILS # BLD AUTO: 0 X10E3/UL (ref 0–0.2)
BASOPHILS NFR BLD AUTO: 1 %
BILIRUB SERPL-MCNC: 0.8 MG/DL (ref 0–1.2)
BILIRUB UR QL STRIP: NEGATIVE
BUN SERPL-MCNC: 20 MG/DL (ref 6–24)
BUN/CREAT SERPL: 14 (ref 9–20)
CALCIUM SERPL-MCNC: 9.6 MG/DL (ref 8.7–10.2)
CASTS URNS MICRO: NORMAL
CASTS URNS QL MICRO: NORMAL /LPF
CHLORIDE SERPL-SCNC: 102 MMOL/L (ref 96–106)
CHOLEST SERPL-MCNC: 204 MG/DL (ref 100–199)
CO2 SERPL-SCNC: 23 MMOL/L (ref 20–29)
COLOR UR: YELLOW
CREAT SERPL-MCNC: 1.41 MG/DL (ref 0.76–1.27)
CRYSTALS URNS MICRO: NORMAL
EGFRCR SERPLBLD CKD-EPI 2021: 61 ML/MIN/1.73
EOSINOPHIL # BLD AUTO: 0.1 X10E3/UL (ref 0–0.4)
EOSINOPHIL NFR BLD AUTO: 2 %
EPI CELLS #/AREA URNS HPF: NORMAL /HPF (ref 0–10)
ERYTHROCYTE [DISTWIDTH] IN BLOOD BY AUTOMATED COUNT: 13.3 % (ref 11.6–15.4)
GLOBULIN SER CALC-MCNC: 1.8 G/DL (ref 1.5–4.5)
GLUCOSE SERPL-MCNC: 101 MG/DL (ref 70–99)
GLUCOSE UR QL STRIP: NEGATIVE
HBV SURFACE AB SER QL: REACTIVE
HCT VFR BLD AUTO: 43.4 % (ref 37.5–51)
HCV IGG SERPL QL IA: NON REACTIVE
HDLC SERPL-MCNC: 62 MG/DL
HGB BLD-MCNC: 14.9 G/DL (ref 13–17.7)
HGB UR QL STRIP: NEGATIVE
IMM GRANULOCYTES # BLD AUTO: 0 X10E3/UL (ref 0–0.1)
IMM GRANULOCYTES NFR BLD AUTO: 0 %
IMMATURE CELLS  115398: NORMAL
KETONES UR QL STRIP: NEGATIVE
LABORATORY COMMENT REPORT: ABNORMAL
LDLC SERPL CALC-MCNC: 130 MG/DL (ref 0–99)
LEUKOCYTE ESTERASE UR QL STRIP: NEGATIVE
LYMPHOCYTES # BLD AUTO: 1.3 X10E3/UL (ref 0.7–3.1)
LYMPHOCYTES NFR BLD AUTO: 31 %
MCH RBC QN AUTO: 31 PG (ref 26.6–33)
MCHC RBC AUTO-ENTMCNC: 34.3 G/DL (ref 31.5–35.7)
MCV RBC AUTO: 90 FL (ref 79–97)
MICRO URNS: NORMAL
MICRO URNS: NORMAL
MONOCYTES # BLD AUTO: 0.3 X10E3/UL (ref 0.1–0.9)
MONOCYTES NFR BLD AUTO: 7 %
MORPHOLOGY BLD-IMP: NORMAL
MUCOUS THREADS URNS QL MICRO: NORMAL
NEUTROPHILS # BLD AUTO: 2.5 X10E3/UL (ref 1.4–7)
NEUTROPHILS NFR BLD AUTO: 59 %
NITRITE UR QL STRIP: NEGATIVE
NRBC BLD AUTO-RTO: NORMAL %
PH UR STRIP: 6 [PH] (ref 5–7.5)
PLATELET # BLD AUTO: 262 X10E3/UL (ref 150–450)
POTASSIUM SERPL-SCNC: 4.4 MMOL/L (ref 3.5–5.2)
PROT SERPL-MCNC: 6.7 G/DL (ref 6–8.5)
PROT UR QL STRIP: NEGATIVE
PSA SERPL-MCNC: 0.4 NG/ML (ref 0–4)
RBC # BLD AUTO: 4.8 X10E6/UL (ref 4.14–5.8)
RBC #/AREA URNS HPF: NORMAL /HPF (ref 0–2)
RENAL EPI CELLS #/AREA URNS HPF: NORMAL /[HPF]
SODIUM SERPL-SCNC: 140 MMOL/L (ref 134–144)
SP GR UR STRIP: 1.01 (ref 1–1.03)
T VAGINALIS URNS QL MICRO: NORMAL
TRIGL SERPL-MCNC: 65 MG/DL (ref 0–149)
UNIDENT CRYS URNS QL MICRO: NORMAL
URINALYSIS REFLEX  377202: NORMAL
URNS CMNT MICRO: NORMAL
UROBILINOGEN UR STRIP-MCNC: 0.2 MG/DL (ref 0.2–1)
VLDLC SERPL CALC-MCNC: 12 MG/DL (ref 5–40)
WBC # BLD AUTO: 4.2 X10E3/UL (ref 3.4–10.8)
WBC #/AREA URNS HPF: NORMAL /HPF (ref 0–5)
YEAST #/AREA URNS HPF: NORMAL /[HPF]

## 2024-07-24 ENCOUNTER — APPOINTMENT (OUTPATIENT)
Dept: MEDICAL GROUP | Facility: PHYSICIAN GROUP | Age: 49
End: 2024-07-24
Payer: COMMERCIAL

## 2024-07-24 ENCOUNTER — PATIENT MESSAGE (OUTPATIENT)
Dept: MEDICAL GROUP | Facility: PHYSICIAN GROUP | Age: 49
End: 2024-07-24

## 2024-07-24 DIAGNOSIS — F33.1 MODERATE EPISODE OF RECURRENT MAJOR DEPRESSIVE DISORDER (HCC): ICD-10-CM

## 2024-07-24 DIAGNOSIS — F41.1 GAD (GENERALIZED ANXIETY DISORDER): ICD-10-CM

## 2024-07-24 RX ORDER — LAMOTRIGINE 50 MG/1
100 TABLET, EXTENDED RELEASE ORAL DAILY
Qty: 180 TABLET | Refills: 3 | Status: SHIPPED | OUTPATIENT
Start: 2024-07-24

## 2024-08-31 SDOH — HEALTH STABILITY: PHYSICAL HEALTH: ON AVERAGE, HOW MANY DAYS PER WEEK DO YOU ENGAGE IN MODERATE TO STRENUOUS EXERCISE (LIKE A BRISK WALK)?: 3 DAYS

## 2024-08-31 SDOH — ECONOMIC STABILITY: FOOD INSECURITY: WITHIN THE PAST 12 MONTHS, THE FOOD YOU BOUGHT JUST DIDN'T LAST AND YOU DIDN'T HAVE MONEY TO GET MORE.: NEVER TRUE

## 2024-08-31 SDOH — HEALTH STABILITY: PHYSICAL HEALTH: ON AVERAGE, HOW MANY MINUTES DO YOU ENGAGE IN EXERCISE AT THIS LEVEL?: 30 MIN

## 2024-08-31 SDOH — ECONOMIC STABILITY: INCOME INSECURITY: IN THE LAST 12 MONTHS, WAS THERE A TIME WHEN YOU WERE NOT ABLE TO PAY THE MORTGAGE OR RENT ON TIME?: NO

## 2024-08-31 SDOH — ECONOMIC STABILITY: INCOME INSECURITY: HOW HARD IS IT FOR YOU TO PAY FOR THE VERY BASICS LIKE FOOD, HOUSING, MEDICAL CARE, AND HEATING?: NOT HARD AT ALL

## 2024-08-31 SDOH — ECONOMIC STABILITY: FOOD INSECURITY: WITHIN THE PAST 12 MONTHS, YOU WORRIED THAT YOUR FOOD WOULD RUN OUT BEFORE YOU GOT MONEY TO BUY MORE.: NEVER TRUE

## 2024-08-31 ASSESSMENT — SOCIAL DETERMINANTS OF HEALTH (SDOH)
IN A TYPICAL WEEK, HOW MANY TIMES DO YOU TALK ON THE PHONE WITH FAMILY, FRIENDS, OR NEIGHBORS?: ONCE A WEEK
DO YOU BELONG TO ANY CLUBS OR ORGANIZATIONS SUCH AS CHURCH GROUPS UNIONS, FRATERNAL OR ATHLETIC GROUPS, OR SCHOOL GROUPS?: NO
DO YOU BELONG TO ANY CLUBS OR ORGANIZATIONS SUCH AS CHURCH GROUPS UNIONS, FRATERNAL OR ATHLETIC GROUPS, OR SCHOOL GROUPS?: NO
HOW OFTEN DO YOU ATTEND CHURCH OR RELIGIOUS SERVICES?: NEVER
HOW OFTEN DO YOU GET TOGETHER WITH FRIENDS OR RELATIVES?: NEVER
WITHIN THE PAST 12 MONTHS, YOU WORRIED THAT YOUR FOOD WOULD RUN OUT BEFORE YOU GOT THE MONEY TO BUY MORE: NEVER TRUE
IN A TYPICAL WEEK, HOW MANY TIMES DO YOU TALK ON THE PHONE WITH FAMILY, FRIENDS, OR NEIGHBORS?: ONCE A WEEK
IN THE PAST 12 MONTHS, HAS THE ELECTRIC, GAS, OIL, OR WATER COMPANY THREATENED TO SHUT OFF SERVICE IN YOUR HOME?: NO
HOW HARD IS IT FOR YOU TO PAY FOR THE VERY BASICS LIKE FOOD, HOUSING, MEDICAL CARE, AND HEATING?: NOT HARD AT ALL
HOW OFTEN DO YOU ATTENT MEETINGS OF THE CLUB OR ORGANIZATION YOU BELONG TO?: NEVER
HOW OFTEN DO YOU GET TOGETHER WITH FRIENDS OR RELATIVES?: NEVER
HOW OFTEN DO YOU HAVE A DRINK CONTAINING ALCOHOL: NEVER
HOW MANY DRINKS CONTAINING ALCOHOL DO YOU HAVE ON A TYPICAL DAY WHEN YOU ARE DRINKING: PATIENT DOES NOT DRINK
HOW OFTEN DO YOU ATTENT MEETINGS OF THE CLUB OR ORGANIZATION YOU BELONG TO?: NEVER
HOW OFTEN DO YOU HAVE SIX OR MORE DRINKS ON ONE OCCASION: NEVER
HOW OFTEN DO YOU ATTEND CHURCH OR RELIGIOUS SERVICES?: NEVER

## 2024-08-31 ASSESSMENT — LIFESTYLE VARIABLES
HOW OFTEN DO YOU HAVE SIX OR MORE DRINKS ON ONE OCCASION: NEVER
HOW MANY STANDARD DRINKS CONTAINING ALCOHOL DO YOU HAVE ON A TYPICAL DAY: PATIENT DOES NOT DRINK
HOW OFTEN DO YOU HAVE A DRINK CONTAINING ALCOHOL: NEVER
SKIP TO QUESTIONS 9-10: 1
AUDIT-C TOTAL SCORE: 0

## 2024-09-01 NOTE — PROGRESS NOTES
Verbal consent was acquired by the patient to use [employer] ambient listening note generation during this visit Yes       Subjective:     CC:   Chief Complaint   Patient presents with    Annual Exam         HPI:   History of Present Illness  Carlos Holt is a 49-year-old male who presents for annual wellness visit. He reports a fluctuating mental health status, with good and bad days. He is currently  from his wife, but they maintain a physical relationship. His children are in their teenage years, and he finds it challenging to understand their emotions. He has moved out of the family home. He is on Zoloft 50 mg and lamotrigine 100 mg, and requests refills without any dosage adjustments. He also requests a refill of Ativan. His last lab tests were conducted in 07/2024. He has gained weight, with his baseline being 175 pounds. His diet varies, but he has recently started a Mediterranean diet. He experiences frequent injuries, which have hindered his exercise routines. He believes his back was strained during mountain biking and pull-ups. He is considering seeking chiropractic treatment.He does not consume alcohol. He is considering getting the shingles vaccine early. He has regular eye check-ups and dental care.        History reviewed. No pertinent past medical history.    Social History     Tobacco Use    Smoking status: Never    Smokeless tobacco: Never   Vaping Use    Vaping status: Never Used   Substance Use Topics    Alcohol use: Never    Drug use: Never       Current Outpatient Medications Ordered in Epic   Medication Sig Dispense Refill    LamoTRIgine 50 MG TABLET SR 24 HR Take 100 mg by mouth every day. 180 Tablet 3    sertraline (ZOLOFT) 50 MG Tab Take 1 Tablet by mouth every day. 90 Tablet 3    LORazepam (ATIVAN) 1 MG Tab Take 1 Tablet by mouth every four hours as needed for Anxiety for up to 30 days. 30 Tablet 0     No current Epic-ordered facility-administered medications on file.  "      Allergies:  Penicillins    Health Maintenance: Completed    Review of Systems:  Constitutional: Negative for fever, chills.  Respiratory: Negative for cough and shortness of breath.    Cardiovascular: Negative for chest pain or palpitations.  Gastrointestinal: Negative for abdominal pain, nausea, vomiting, and diarrhea.   Neurological: Negative for headaches, numbness, or tingling.  Psychiatric: Negative for anxiety or depression.      Objective:       Exam:  /78   Pulse (!) 49   Temp 36.2 °C (97.2 °F) (Temporal)   Ht 1.803 m (5' 11\")   Wt 82.1 kg (181 lb)   SpO2 98%   BMI 25.24 kg/m²  Body mass index is 25.24 kg/m².    Constitutional: Well-developed, no acute distress.  HEENT: Pupils are equal, round, and reactive to light. Oropharynx is without erythema, edema or exudates.   Neck: No thyromegaly or palpable thyroid nodules. No cervical or supraclavicular lymphadenopathy noted.  Lungs: Clear to auscultation bilaterally. No wheezes, rhonchi, or rales.   Cardiovascular: Regular rate and rhythm, no murmurs noted.   Abdomen: Soft, nontender, nondistended.   Extremities: No edema or erythema.  Psychiatric:  Behavior, mood, and affect are appropriate.    Assessment & Plan:     49 y.o. male with the following -     Wellness examination  The patient feels well and denies any complaints. There are no concerning signs and/or symptoms of any significant disease process.  Normal exam findings.  The patient was counseled about regular exercise, healthy diet, abstinence from smoking, drugs, and excessive alcohol.     LILLIAN (generalized anxiety disorder)  Mild episode of recurrent major depressive disorder (HCC)   Chronic medical conditions, stable.  The patient currently takes sertraline 50 mg daily and lamotrigine 100 mg daily for his depression and anxiety.  He also takes a very small amount of lorazepam, typically a quarter of a 1 mg tablet, on an as-needed basis.  He is due for refills of these medications. " Obtained and reviewed patient utilization report from [facility] pharmacy database on 9/8/2024 11:45 AM  prior to writing prescription for controlled substance II, III or IV per [place] bill . Based on assessment of the report, the prescription is medically necessary.   -Continue current regimen of sertraline 50 mg daily, lamotrigine 100 mg daily, and lorazepam as needed, the patient states he typically takes a quarter of a tablet if needed and a prescription will last from 6 months to a year  - Controlled Substance Treatment Agreement signed and scanned into the patient's chart on 9/1/2024  - LamoTRIgine 50 MG TABLET SR 24 HR; Take 100 mg by mouth every day.  Dispense: 180 Tablet; Refill: 3  - sertraline (ZOLOFT) 50 MG Tab; Take 1 Tablet by mouth every day.  Dispense: 90 Tablet; Refill: 3  - LORazepam (ATIVAN) 1 MG Tab; Take 1 Tablet by mouth every four hours as needed for Anxiety for up to 30 days.  Dispense: 30 Tablet; Refill: 0    Mixed hyperlipidemia  Chronic condition, progressive.  Currently managed through diet and exercise.  The patient has not previously been on cholesterol medication.  It is noted that the patient's weight has increased from 167 to 181. The 10-year ASCVD risk score (Savanah DK, et al., 2019) is: 2.4% The patient's 10-year risk of heart disease remains low at 2.4, largely due to his young age. He was advised to reduce his intake of cheese, meats, butter, and fast food to help lower his LDL levels. A Mediterranean diet was recommended for its health benefits.     HCM: Completed  Labs per orders  Immunizations per orders  The patient was counseled about regular exercise, healthy diet, abstinence from smoking, drugs, and excessive alcohol.      Return in about 1 year (around 9/3/2025) for Annual/Wellness Visit.    Please note that this dictation was created using voice recognition software. I have made every reasonable attempt to correct obvious errors, but I expect that there are errors  of grammar and possibly content that I did not discover before finalizing the note.

## 2024-09-03 ENCOUNTER — APPOINTMENT (OUTPATIENT)
Dept: MEDICAL GROUP | Facility: PHYSICIAN GROUP | Age: 49
End: 2024-09-03
Payer: COMMERCIAL

## 2024-09-03 VITALS
HEIGHT: 71 IN | SYSTOLIC BLOOD PRESSURE: 124 MMHG | TEMPERATURE: 97.2 F | HEART RATE: 49 BPM | BODY MASS INDEX: 25.34 KG/M2 | WEIGHT: 181 LBS | DIASTOLIC BLOOD PRESSURE: 78 MMHG | OXYGEN SATURATION: 98 %

## 2024-09-03 DIAGNOSIS — F33.0 MILD EPISODE OF RECURRENT MAJOR DEPRESSIVE DISORDER (HCC): ICD-10-CM

## 2024-09-03 DIAGNOSIS — Z00.00 WELLNESS EXAMINATION: ICD-10-CM

## 2024-09-03 DIAGNOSIS — E78.2 MIXED HYPERLIPIDEMIA: ICD-10-CM

## 2024-09-03 DIAGNOSIS — F41.1 GAD (GENERALIZED ANXIETY DISORDER): ICD-10-CM

## 2024-09-03 PROCEDURE — 3074F SYST BP LT 130 MM HG: CPT | Performed by: INTERNAL MEDICINE

## 2024-09-03 PROCEDURE — 99396 PREV VISIT EST AGE 40-64: CPT | Performed by: INTERNAL MEDICINE

## 2024-09-03 PROCEDURE — 3078F DIAST BP <80 MM HG: CPT | Performed by: INTERNAL MEDICINE

## 2024-09-03 RX ORDER — LORAZEPAM 1 MG/1
1 TABLET ORAL EVERY 4 HOURS PRN
Qty: 30 TABLET | Refills: 0 | Status: SHIPPED | OUTPATIENT
Start: 2024-09-03 | End: 2024-10-03

## 2024-09-03 RX ORDER — LAMOTRIGINE 50 MG/1
100 TABLET, EXTENDED RELEASE ORAL DAILY
Qty: 180 TABLET | Refills: 3 | Status: SHIPPED | OUTPATIENT
Start: 2024-09-03

## 2024-09-03 ASSESSMENT — FIBROSIS 4 INDEX: FIB4 SCORE: 1.19

## 2025-02-07 ENCOUNTER — OFFICE VISIT (OUTPATIENT)
Dept: MEDICAL GROUP | Facility: PHYSICIAN GROUP | Age: 50
End: 2025-02-07
Payer: COMMERCIAL

## 2025-02-07 VITALS
HEART RATE: 60 BPM | SYSTOLIC BLOOD PRESSURE: 98 MMHG | BODY MASS INDEX: 22.21 KG/M2 | WEIGHT: 164 LBS | OXYGEN SATURATION: 98 % | HEIGHT: 72 IN | RESPIRATION RATE: 18 BRPM | DIASTOLIC BLOOD PRESSURE: 60 MMHG | TEMPERATURE: 97.9 F

## 2025-02-07 DIAGNOSIS — K58.9 IRRITABLE BOWEL SYNDROME, UNSPECIFIED TYPE: ICD-10-CM

## 2025-02-07 DIAGNOSIS — N43.3 HYDROCELE, UNSPECIFIED HYDROCELE TYPE: ICD-10-CM

## 2025-02-07 DIAGNOSIS — N18.31 STAGE 3A CHRONIC KIDNEY DISEASE: ICD-10-CM

## 2025-02-07 DIAGNOSIS — Z13.29 SCREENING FOR ENDOCRINE, METABOLIC AND IMMUNITY DISORDER: ICD-10-CM

## 2025-02-07 DIAGNOSIS — E78.2 MIXED HYPERLIPIDEMIA: ICD-10-CM

## 2025-02-07 DIAGNOSIS — R19.09 INGUINAL BULGE: ICD-10-CM

## 2025-02-07 DIAGNOSIS — F33.1 MODERATE EPISODE OF RECURRENT MAJOR DEPRESSIVE DISORDER (HCC): ICD-10-CM

## 2025-02-07 DIAGNOSIS — Z13.0 SCREENING FOR ENDOCRINE, METABOLIC AND IMMUNITY DISORDER: ICD-10-CM

## 2025-02-07 DIAGNOSIS — R73.01 IMPAIRED FASTING GLUCOSE: ICD-10-CM

## 2025-02-07 DIAGNOSIS — Z13.228 SCREENING FOR ENDOCRINE, METABOLIC AND IMMUNITY DISORDER: ICD-10-CM

## 2025-02-07 DIAGNOSIS — E55.9 VITAMIN D DEFICIENCY: ICD-10-CM

## 2025-02-07 DIAGNOSIS — F41.1 GAD (GENERALIZED ANXIETY DISORDER): ICD-10-CM

## 2025-02-07 PROCEDURE — 3074F SYST BP LT 130 MM HG: CPT

## 2025-02-07 PROCEDURE — 99214 OFFICE O/P EST MOD 30 MIN: CPT

## 2025-02-07 PROCEDURE — 3078F DIAST BP <80 MM HG: CPT

## 2025-02-07 ASSESSMENT — PATIENT HEALTH QUESTIONNAIRE - PHQ9
5. POOR APPETITE OR OVEREATING: NOT AT ALL
1. LITTLE INTEREST OR PLEASURE IN DOING THINGS: NOT AT ALL
2. FEELING DOWN, DEPRESSED, IRRITABLE, OR HOPELESS: SEVERAL DAYS
8. MOVING OR SPEAKING SO SLOWLY THAT OTHER PEOPLE COULD HAVE NOTICED. OR THE OPPOSITE, BEING SO FIGETY OR RESTLESS THAT YOU HAVE BEEN MOVING AROUND A LOT MORE THAN USUAL: NOT AT ALL
3. TROUBLE FALLING OR STAYING ASLEEP OR SLEEPING TOO MUCH: NOT AT ALL
9. THOUGHTS THAT YOU WOULD BE BETTER OFF DEAD, OR OF HURTING YOURSELF: NOT AT ALL
SUM OF ALL RESPONSES TO PHQ9 QUESTIONS 1 AND 2: 1
4. FEELING TIRED OR HAVING LITTLE ENERGY: SEVERAL DAYS
SUM OF ALL RESPONSES TO PHQ QUESTIONS 1-9: 3
6. FEELING BAD ABOUT YOURSELF - OR THAT YOU ARE A FAILURE OR HAVE LET YOURSELF OR YOUR FAMILY DOWN: SEVERAL DAYS
7. TROUBLE CONCENTRATING ON THINGS, SUCH AS READING THE NEWSPAPER OR WATCHING TELEVISION: NOT AT ALL

## 2025-02-07 ASSESSMENT — ANXIETY QUESTIONNAIRES
3. WORRYING TOO MUCH ABOUT DIFFERENT THINGS: SEVERAL DAYS
IF YOU CHECKED OFF ANY PROBLEMS ON THIS QUESTIONNAIRE, HOW DIFFICULT HAVE THESE PROBLEMS MADE IT FOR YOU TO DO YOUR WORK, TAKE CARE OF THINGS AT HOME, OR GET ALONG WITH OTHER PEOPLE: NOT DIFFICULT AT ALL
7. FEELING AFRAID AS IF SOMETHING AWFUL MIGHT HAPPEN: SEVERAL DAYS
GAD7 TOTAL SCORE: 7
2. NOT BEING ABLE TO STOP OR CONTROL WORRYING: SEVERAL DAYS
4. TROUBLE RELAXING: SEVERAL DAYS
6. BECOMING EASILY ANNOYED OR IRRITABLE: SEVERAL DAYS
1. FEELING NERVOUS, ANXIOUS, OR ON EDGE: SEVERAL DAYS
5. BEING SO RESTLESS THAT IT IS HARD TO SIT STILL: SEVERAL DAYS

## 2025-02-07 ASSESSMENT — FIBROSIS 4 INDEX: FIB4 SCORE: 1.19

## 2025-02-07 NOTE — PROGRESS NOTES
Subjective:     Chief Complaint   Patient presents with    [facility]    Hernia     History of Present Illness  Carlos Holt is a 49-year-old male here to establish care and discuss any health concerns. His prior PCP was Misty Lauren MD.    He has a history of anxiety, which he reports does not interfere with his daily activities or social interactions. He is currently on a regimen of sertraline 25 mg daily and lamotrigine 100 mg twice daily, which he finds beneficial in managing his mood swings. He was previously advised to take the entire dose of lamotrigine at night but discovered that splitting the dose between morning and night was more effective for him.    He underwent a colonoscopy a few years prior, with a recommendation for a follow-up procedure in 2028. He declines influenza vaccine today. He reports no chest pain, shortness of breath at rest, lightheadedness, dizziness, or presence of blood in his urine or stool. He also reports no migraines or headaches that are unmanageable with his current blood pressure medication.    He has a known diagnosis of hydrocele, which has remained stable in size and does not cause him discomfort. He has previously consulted with a urologist regarding this condition.    He suspects the presence of an inguinal hernia on the left side, first noticed in early December 2024. He reports no associated discomfort and has no prior history of similar issues. He is curious about the possibility of engaging in moderately strenuous activities given his current condition.    He continues to experience irritable bowel syndrome, which he manages effectively through dietary modifications.    Supplemental Information  He had a nasal surgery in his early 20s and tonsillectomy as a child.    SOCIAL HISTORY  He does not smoke.    FAMILY HISTORY  His paternal grandfather had colon cancer. His father had a tumor in his kidney, high blood pressure, and osteoporosis. His mother has  "dementia. His maternal grandmother had dementia. His aunts on his father's side have diabetes. He reports no family history of breast cancer, heart disease, or high cholesterol.    Allergies: Penicillins    Current Outpatient Medications:     LamoTRIgine 50 MG TABLET SR 24 HR, Take 100 mg by mouth every day., Disp: 180 Tablet, Rfl: 3    sertraline (ZOLOFT) 50 MG Tab, Take 1 Tablet by mouth every day., Disp: 90 Tablet, Rfl: 3     ROS per HPI  Health Maintenance: Completed    Objective:     BP 98/60 (BP Location: Left arm, Patient Position: Sitting, BP Cuff Size: Adult)   Pulse 60   Temp 36.6 °C (97.9 °F) (Temporal)   Resp 18   Ht 1.816 m (5' 11.5\")   Wt 74.4 kg (164 lb)   SpO2 98%  Body mass index is 22.55 kg/m².    Physical Exam  Vitals reviewed.   Constitutional:       General: He is not in acute distress.     Appearance: Normal appearance. He is not ill-appearing.   Cardiovascular:      Rate and Rhythm: Normal rate and regular rhythm.      Heart sounds: Normal heart sounds.   Pulmonary:      Effort: Pulmonary effort is normal.      Breath sounds: Normal breath sounds.   Abdominal:      General: Abdomen is flat.      Palpations: Abdomen is soft.      Tenderness: There is no abdominal tenderness.      Hernia: A hernia is present. Hernia is present in the left inguinal area. There is no hernia in the umbilical area, ventral area or right inguinal area.       Musculoskeletal:         General: Normal range of motion.      Cervical back: Normal range of motion.   Skin:     General: Skin is warm and dry.   Neurological:      General: No focal deficit present.      Mental Status: He is alert.   Psychiatric:         Mood and Affect: Mood normal.         Behavior: Behavior normal.         Thought Content: Thought content normal.         Judgment: Judgment normal.        Results  Laboratory Studies  Elevated kidney function test. Slightly increased fasting glucose. Cholesterol was slightly high.    Results for orders " placed or performed in visit on 04/22/24   CBC WITH DIFFERENTIAL    Collection Time: 04/22/24  6:43 AM   Result Value Ref Range    WBC 4.2 3.4 - 10.8 x10E3/uL    RBC 4.80 4.14 - 5.80 x10E6/uL    Hemoglobin 14.9 13.0 - 17.7 g/dL    Hematocrit 43.4 37.5 - 51.0 %    MCV 90 79 - 97 fL    MCH 31.0 26.6 - 33.0 pg    MCHC 34.3 31.5 - 35.7 g/dL    RDW 13.3 11.6 - 15.4 %    Platelet Count 262 150 - 450 x10E3/uL    Neutrophils-Polys 59 Not Estab. %    Lymphocytes 31 Not Estab. %    Monocytes 7 Not Estab. %    Eosinophils 2 Not Estab. %    Basophils 1 Not Estab. %    Immature Cells CANCELED     Neutrophils (Absolute) 2.5 1.4 - 7.0 x10E3/uL    Lymphs (Absolute) 1.3 0.7 - 3.1 x10E3/uL    Monos (Absolute) 0.3 0.1 - 0.9 x10E3/uL    Eos (Absolute) 0.1 0.0 - 0.4 x10E3/uL    Baso (Absolute) 0.0 0.0 - 0.2 x10E3/uL    Immature Granulocytes 0 Not Estab. %    Immature Granulocytes (abs) 0.0 0.0 - 0.1 x10E3/uL    Nucleated RBC CANCELED     Comments-Diff CANCELED    COMP METABOLIC PANEL    Collection Time: 04/22/24  6:43 AM   Result Value Ref Range    Glucose 101 (H) 70 - 99 mg/dL    Bun 20 6 - 24 mg/dL    Creatinine 1.41 (H) 0.76 - 1.27 mg/dL    eGFR 61 >59 mL/min/1.73    Bun-Creatinine Ratio 14 9 - 20    Sodium 140 134 - 144 mmol/L    Potassium 4.4 3.5 - 5.2 mmol/L    Chloride 102 96 - 106 mmol/L    Co2 23 20 - 29 mmol/L    Calcium 9.6 8.7 - 10.2 mg/dL    Total Protein 6.7 6.0 - 8.5 g/dL    Albumin 4.9 4.1 - 5.1 g/dL    Globulin 1.8 1.5 - 4.5 g/dL    A-G Ratio 2.7 (H) 1.2 - 2.2    Total Bilirubin 0.8 0.0 - 1.2 mg/dL    Alkaline Phosphatase 66 44 - 121 IU/L    AST(SGOT) 22 0 - 40 IU/L    ALT(SGPT) 12 0 - 44 IU/L   UA/M W/RFLX CULTURE, ROUTINE    Collection Time: 04/22/24  6:43 AM   Result Value Ref Range    Specific Gravity 1.015 1.005 - 1.030    Ph 6.0 5.0 - 7.5    Color Yellow Yellow    Character Clear Clear    Leukocyte Esterase Negative Negative    Protein Negative Negative/Trace    Glucose Negative Negative    Ketones Negative  Negative    Occult Blood Negative Negative    Bilirubin Negative Negative    Urobilinogen Semi Qnt 0.2 0.2 - 1.0 mg/dL    Nitrite Negative Negative    Microscopic Exam Comment     Microscopic Exam See below:     Urinalysis Reflex Comment    MICROSCOPIC EXAMINATION    Collection Time: 04/22/24  6:43 AM   Result Value Ref Range    WBC None seen 0 - 5 /hpf    RBC None seen 0 - 2 /hpf    Epithelial Cells Non Renal None seen 0 - 10 /hpf    Epithelial Cells Renal CANCELED     Urine Casts None seen None seen /lpf    Cast Type CANCELED     Urine Crystals CANCELED     Crystal Type CANCELED     Mucous Threads CANCELED     Bacteria None seen None seen/Few    Yeast Cells CANCELED     Trichomonas CANCELED     Comment CANCELED    LIPID PANEL    Collection Time: 04/22/24  6:43 AM   Result Value Ref Range    Cholesterol,Tot 204 (H) 100 - 199 mg/dL    Triglycerides 65 0 - 149 mg/dL    HDL 62 >39 mg/dL    VLDL Cholesterol Calc 12 5 - 40 mg/dL    LDL Chol Calc (NIH) 130 (H) 0 - 99 mg/dL    Comment: CANCELED    HEP B SURFACE AB    Collection Time: 04/22/24  6:43 AM   Result Value Ref Range    Hep B Surgace Ab, Qual Reactive    PROSTATE SPECIFIC AG    Collection Time: 04/22/24  6:43 AM   Result Value Ref Range    Prostatic Specific Antigen Tot 0.4 0.0 - 4.0 ng/mL   HEP C VIRUS ANTIBODY    Collection Time: 04/22/24  6:43 AM   Result Value Ref Range    Hepatitis C Antibody Non Reactive Non Reactive     Assessment & Plan:     The following plan was discussed through shared decision making with the patient:    1. Stage 3a chronic kidney disease  Comp Metabolic Panel    ESTIMATED GFR      2. Impaired fasting glucose  HEMOGLOBIN A1C      3. Mixed hyperlipidemia  Comp Metabolic Panel    Lipid Profile      4. Moderate episode of recurrent major depressive disorder (HCC)  CBC WITH DIFFERENTIAL    VITAMIN D,25 HYDROXY (DEFICIENCY)    TSH WITH REFLEX TO FT4      5. LILLIAN (generalized anxiety disorder)  CBC WITH DIFFERENTIAL    VITAMIN D,25 HYDROXY  (DEFICIENCY)    TSH WITH REFLEX TO FT4      6. Screening for endocrine, metabolic and immunity disorder  CBC WITH DIFFERENTIAL    Comp Metabolic Panel    HEMOGLOBIN A1C    TSH WITH REFLEX TO FT4      7. Vitamin D deficiency  VITAMIN D,25 HYDROXY (DEFICIENCY)      8. Inguinal bulge  US-INGUINAL HERNIA      9. Hydrocele, unspecified hydrocele type        10. Irritable bowel syndrome, unspecified type            Assessment & Plan  1. Anxiety/Depression.  He is currently taking sertraline 25 mg daily and lamotrigine 50 mg twice a day. He reports that these medications have been effective in managing his mood swings, especially during his divorce. He will continue with the current medication regimen. He is advised to inform the provider when refills are needed.    2. Hydrocele.  He has a history of a hydrocele, which has not increased in size or caused significant discomfort. He is advised to continue monitoring the hydrocele and report any changes or discomfort.    4. Suspected inguinal hernia.  There is a noted weakness in the abdominal wall, suggesting a potential inguinal hernia. An ultrasound will be ordered to confirm the presence of a hernia. He is advised to engage in moderate exercise with caution. If the ultrasound confirms a hernia, a referral to general surgery will be made for further evaluation and potential surgical intervention.    5. Irritable bowel syndrome.  He manages his irritable bowel syndrome effectively through dietary modifications. No changes in management are necessary at this time.    Health maintenance.  He had a colonoscopy a couple of years ago and is due for another one in 2028. He is advised to get his influenza vaccine at a local pharmacy if interested. Fasting labs will be ordered to monitor kidney function, A1c, and cholesterol levels. He is advised to follow up with these labs within the next couple of months.    Return in about 3 months (around 5/7/2025) for Labs.       Please note  that this dictation was created using voice recognition software. I have made every reasonable attempt to correct obvious errors, but I expect that there are errors of grammar and possibly content that I did not discover before finalizing the note.    TANIA Rahman  [facility]

## 2025-04-02 LAB
25(OH)D3+25(OH)D2 SERPL-MCNC: 28.7 NG/ML (ref 30–100)
ALBUMIN SERPL-MCNC: 4.4 G/DL (ref 4.1–5.1)
ALP SERPL-CCNC: 68 IU/L (ref 44–121)
ALT SERPL-CCNC: 15 IU/L (ref 0–44)
AST SERPL-CCNC: 23 IU/L (ref 0–40)
BASOPHILS # BLD AUTO: 0 X10E3/UL (ref 0–0.2)
BASOPHILS NFR BLD AUTO: 1 %
BILIRUB SERPL-MCNC: 0.6 MG/DL (ref 0–1.2)
BUN SERPL-MCNC: 20 MG/DL (ref 6–24)
BUN/CREAT SERPL: 16 (ref 9–20)
CALCIUM SERPL-MCNC: 9.3 MG/DL (ref 8.7–10.2)
CHLORIDE SERPL-SCNC: 104 MMOL/L (ref 96–106)
CHOLEST SERPL-MCNC: 201 MG/DL (ref 100–199)
CO2 SERPL-SCNC: 24 MMOL/L (ref 20–29)
CREAT SERPL-MCNC: 1.25 MG/DL (ref 0.76–1.27)
EGFRCR SERPLBLD CKD-EPI 2021: 70 ML/MIN/1.73
EOSINOPHIL # BLD AUTO: 0.1 X10E3/UL (ref 0–0.4)
EOSINOPHIL NFR BLD AUTO: 2 %
ERYTHROCYTE [DISTWIDTH] IN BLOOD BY AUTOMATED COUNT: 12.4 % (ref 11.6–15.4)
GLOBULIN SER CALC-MCNC: 2 G/DL (ref 1.5–4.5)
GLUCOSE SERPL-MCNC: 100 MG/DL (ref 70–99)
HBA1C MFR BLD: 4.8 % (ref 4.8–5.6)
HCT VFR BLD AUTO: 41.7 % (ref 37.5–51)
HDLC SERPL-MCNC: 71 MG/DL
HGB BLD-MCNC: 14.1 G/DL (ref 13–17.7)
IMM GRANULOCYTES # BLD AUTO: 0 X10E3/UL (ref 0–0.1)
IMM GRANULOCYTES NFR BLD AUTO: 0 %
IMMATURE CELLS  115398: NORMAL
LDL CALC COMMENT:: ABNORMAL
LDLC SERPL CALC-MCNC: 116 MG/DL (ref 0–99)
LYMPHOCYTES # BLD AUTO: 1.1 X10E3/UL (ref 0.7–3.1)
LYMPHOCYTES NFR BLD AUTO: 23 %
MCH RBC QN AUTO: 31.1 PG (ref 26.6–33)
MCHC RBC AUTO-ENTMCNC: 33.8 G/DL (ref 31.5–35.7)
MCV RBC AUTO: 92 FL (ref 79–97)
MONOCYTES # BLD AUTO: 0.4 X10E3/UL (ref 0.1–0.9)
MONOCYTES NFR BLD AUTO: 8 %
MORPHOLOGY BLD-IMP: NORMAL
NEUTROPHILS # BLD AUTO: 3 X10E3/UL (ref 1.4–7)
NEUTROPHILS NFR BLD AUTO: 66 %
NRBC BLD AUTO-RTO: NORMAL %
PLATELET # BLD AUTO: 195 X10E3/UL (ref 150–450)
POTASSIUM SERPL-SCNC: 4.2 MMOL/L (ref 3.5–5.2)
PROT SERPL-MCNC: 6.4 G/DL (ref 6–8.5)
RBC # BLD AUTO: 4.53 X10E6/UL (ref 4.14–5.8)
SODIUM SERPL-SCNC: 140 MMOL/L (ref 134–144)
TRIGL SERPL-MCNC: 79 MG/DL (ref 0–149)
TSH SERPL DL<=0.005 MIU/L-ACNC: 3.96 UIU/ML (ref 0.45–4.5)
VLDLC SERPL CALC-MCNC: 14 MG/DL (ref 5–40)
WBC # BLD AUTO: 4.5 X10E3/UL (ref 3.4–10.8)

## 2025-04-08 ENCOUNTER — RESULTS FOLLOW-UP (OUTPATIENT)
Dept: MEDICAL GROUP | Facility: PHYSICIAN GROUP | Age: 50
End: 2025-04-08

## 2025-05-08 ENCOUNTER — APPOINTMENT (OUTPATIENT)
Dept: MEDICAL GROUP | Facility: PHYSICIAN GROUP | Age: 50
End: 2025-05-08
Payer: COMMERCIAL

## 2025-05-08 VITALS
WEIGHT: 173.38 LBS | HEIGHT: 71 IN | TEMPERATURE: 97.5 F | SYSTOLIC BLOOD PRESSURE: 94 MMHG | BODY MASS INDEX: 24.27 KG/M2 | DIASTOLIC BLOOD PRESSURE: 52 MMHG | HEART RATE: 50 BPM | OXYGEN SATURATION: 97 % | RESPIRATION RATE: 18 BRPM

## 2025-05-08 DIAGNOSIS — E55.9 VITAMIN D DEFICIENCY: ICD-10-CM

## 2025-05-08 DIAGNOSIS — K40.90 LEFT INGUINAL HERNIA: ICD-10-CM

## 2025-05-08 DIAGNOSIS — R19.09 INGUINAL BULGE: ICD-10-CM

## 2025-05-08 DIAGNOSIS — E78.5 DYSLIPIDEMIA: ICD-10-CM

## 2025-05-08 PROCEDURE — 3074F SYST BP LT 130 MM HG: CPT

## 2025-05-08 PROCEDURE — 99214 OFFICE O/P EST MOD 30 MIN: CPT

## 2025-05-08 PROCEDURE — 3078F DIAST BP <80 MM HG: CPT

## 2025-05-08 RX ORDER — LORAZEPAM 1 MG/1
1 TABLET ORAL EVERY 4 HOURS PRN
COMMUNITY

## 2025-05-08 ASSESSMENT — FIBROSIS 4 INDEX: FIB4 SCORE: 1.52

## 2025-05-08 NOTE — PROGRESS NOTES
"Subjective:     Chief Complaint   Patient presents with    Hernia     The 10-year ASCVD risk score (Savanah HAYS, et al., 2019) is: 1.4%    History of Present Illness  The patient is a 50-year-old male here for follow-up on his hernia and lab results.    He reports a small bulge in the area of the hernia, which becomes more pronounced during coughing episodes but does not remain protruded. An ultrasound confirmed a reducible left inguinal hernia, and he will be referred to general surgery for further evaluation.    Lab results from the beginning of April indicate slightly elevated cholesterol levels. Using a risk assessment algorithm, his 10-year risk of developing a heart attack or stroke is calculated to be 1.4%, which is below the threshold for initiating statin therapy. He has been advised to maintain a healthy diet and increase physical activity to manage his cholesterol levels.    He has been experiencing episodes of low blood pressure, including today, and is seeking an explanation for this. He does not report any associated symptoms such as lightheadedness or dizziness but does mention frequent drowsiness. He is not currently on any antihypertensive medications. His lab results show normal kidney function and electrolytes, with no signs of anemia.    He has initiated a regimen of vitamin D supplementation due to slightly low levels detected in his lab results.    Allergies: Penicillins  ROS per HPI  Health Maintenance: Completed   Objective:     BP 94/52 (BP Location: Left arm, Patient Position: Sitting, BP Cuff Size: Adult)   Pulse (!) 50   Temp 36.4 °C (97.5 °F) (Temporal)   Resp 18   Ht 1.803 m (5' 11\")   Wt 78.6 kg (173 lb 6 oz)   SpO2 97%   BMI 24.18 kg/m²  Body mass index is 24.18 kg/m².     Physical Exam  Vitals reviewed.   Constitutional:       General: He is not in acute distress.     Appearance: Normal appearance. He is not ill-appearing.   Cardiovascular:      Rate and Rhythm: Normal rate and " regular rhythm.   Pulmonary:      Effort: Pulmonary effort is normal. No respiratory distress.   Skin:     Coloration: Skin is not jaundiced or pale.   Neurological:      General: No focal deficit present.      Mental Status: He is alert and oriented to person, place, and time.   Psychiatric:         Mood and Affect: Mood normal.         Behavior: Behavior normal.         Thought Content: Thought content normal.         Judgment: Judgment normal.        Results  Labs   - Cholesterol: 04/2025, Slightly elevated   - Vitamin D: 04/2025, Slightly low   - Kidney function: 04/2025, Normal    Imaging   - Ultrasound of the left hernia: Fat-containing reducible left hernia    Results for orders placed or performed in visit on 04/22/24   CBC WITH DIFFERENTIAL    Collection Time: 04/22/24  6:43 AM   Result Value Ref Range    WBC 4.2 3.4 - 10.8 x10E3/uL    RBC 4.80 4.14 - 5.80 x10E6/uL    Hemoglobin 14.9 13.0 - 17.7 g/dL    Hematocrit 43.4 37.5 - 51.0 %    MCV 90 79 - 97 fL    MCH 31.0 26.6 - 33.0 pg    MCHC 34.3 31.5 - 35.7 g/dL    RDW 13.3 11.6 - 15.4 %    Platelet Count 262 150 - 450 x10E3/uL    Neutrophils-Polys 59 Not Estab. %    Lymphocytes 31 Not Estab. %    Monocytes 7 Not Estab. %    Eosinophils 2 Not Estab. %    Basophils 1 Not Estab. %    Immature Cells CANCELED     Neutrophils (Absolute) 2.5 1.4 - 7.0 x10E3/uL    Lymphs (Absolute) 1.3 0.7 - 3.1 x10E3/uL    Monos (Absolute) 0.3 0.1 - 0.9 x10E3/uL    Eos (Absolute) 0.1 0.0 - 0.4 x10E3/uL    Baso (Absolute) 0.0 0.0 - 0.2 x10E3/uL    Immature Granulocytes 0 Not Estab. %    Immature Granulocytes (abs) 0.0 0.0 - 0.1 x10E3/uL    Nucleated RBC CANCELED     Comments-Diff CANCELED    COMP METABOLIC PANEL    Collection Time: 04/22/24  6:43 AM   Result Value Ref Range    Glucose 101 (H) 70 - 99 mg/dL    Bun 20 6 - 24 mg/dL    Creatinine 1.41 (H) 0.76 - 1.27 mg/dL    eGFR 61 >59 mL/min/1.73    Bun-Creatinine Ratio 14 9 - 20    Sodium 140 134 - 144 mmol/L    Potassium 4.4 3.5 -  5.2 mmol/L    Chloride 102 96 - 106 mmol/L    Co2 23 20 - 29 mmol/L    Calcium 9.6 8.7 - 10.2 mg/dL    Total Protein 6.7 6.0 - 8.5 g/dL    Albumin 4.9 4.1 - 5.1 g/dL    Globulin 1.8 1.5 - 4.5 g/dL    A-G Ratio 2.7 (H) 1.2 - 2.2    Total Bilirubin 0.8 0.0 - 1.2 mg/dL    Alkaline Phosphatase 66 44 - 121 IU/L    AST(SGOT) 22 0 - 40 IU/L    ALT(SGPT) 12 0 - 44 IU/L   UA/M W/RFLX CULTURE, ROUTINE    Collection Time: 04/22/24  6:43 AM   Result Value Ref Range    Specific Gravity 1.015 1.005 - 1.030    Ph 6.0 5.0 - 7.5    Color Yellow Yellow    Character Clear Clear    Leukocyte Esterase Negative Negative    Protein Negative Negative/Trace    Glucose Negative Negative    Ketones Negative Negative    Occult Blood Negative Negative    Bilirubin Negative Negative    Urobilinogen Semi Qnt 0.2 0.2 - 1.0 mg/dL    Nitrite Negative Negative    Microscopic Exam Comment     Microscopic Exam See below:     Urinalysis Reflex Comment    MICROSCOPIC EXAMINATION    Collection Time: 04/22/24  6:43 AM   Result Value Ref Range    WBC None seen 0 - 5 /hpf    RBC None seen 0 - 2 /hpf    Epithelial Cells Non Renal None seen 0 - 10 /hpf    Epithelial Cells Renal CANCELED     Urine Casts None seen None seen /lpf    Cast Type CANCELED     Urine Crystals CANCELED     Crystal Type CANCELED     Mucous Threads CANCELED     Bacteria None seen None seen/Few    Yeast Cells CANCELED     Trichomonas CANCELED     Comment CANCELED    LIPID PANEL    Collection Time: 04/22/24  6:43 AM   Result Value Ref Range    Cholesterol,Tot 204 (H) 100 - 199 mg/dL    Triglycerides 65 0 - 149 mg/dL    HDL 62 >39 mg/dL    VLDL Cholesterol Calc 12 5 - 40 mg/dL    LDL Chol Calc (NIH) 130 (H) 0 - 99 mg/dL    Comment: CANCELED    HEP B SURFACE AB    Collection Time: 04/22/24  6:43 AM   Result Value Ref Range    Hep B Surgace Ab, Qual Reactive    PROSTATE SPECIFIC AG    Collection Time: 04/22/24  6:43 AM   Result Value Ref Range    Prostatic Specific Antigen Tot 0.4 0.0 - 4.0  ng/mL   HEP C VIRUS ANTIBODY    Collection Time: 04/22/24  6:43 AM   Result Value Ref Range    Hepatitis C Antibody Non Reactive Non Reactive   CBC WITH DIFFERENTIAL    Collection Time: 03/31/25  7:31 AM   Result Value Ref Range    WBC 4.5 3.4 - 10.8 x10E3/uL    RBC 4.53 4.14 - 5.80 x10E6/uL    Hemoglobin 14.1 13.0 - 17.7 g/dL    Hematocrit 41.7 37.5 - 51.0 %    MCV 92 79 - 97 fL    MCH 31.1 26.6 - 33.0 pg    MCHC 33.8 31.5 - 35.7 g/dL    RDW 12.4 11.6 - 15.4 %    Platelet Count 195 150 - 450 x10E3/uL    Neutrophils-Polys 66 Not Estab. %    Lymphocytes 23 Not Estab. %    Monocytes 8 Not Estab. %    Eosinophils 2 Not Estab. %    Basophils 1 Not Estab. %    Immature Cells CANCELED     Neutrophils (Absolute) 3.0 1.4 - 7.0 x10E3/uL    Lymphs (Absolute) 1.1 0.7 - 3.1 x10E3/uL    Monos (Absolute) 0.4 0.1 - 0.9 x10E3/uL    Eos (Absolute) 0.1 0.0 - 0.4 x10E3/uL    Baso (Absolute) 0.0 0.0 - 0.2 x10E3/uL    Immature Granulocytes 0 Not Estab. %    Immature Granulocytes (abs) 0.0 0.0 - 0.1 x10E3/uL    Nucleated RBC CANCELED     Comments-Diff CANCELED    COMP METABOLIC PANEL    Collection Time: 03/31/25  7:31 AM   Result Value Ref Range    Glucose 100 (H) 70 - 99 mg/dL    Bun 20 6 - 24 mg/dL    Creatinine 1.25 0.76 - 1.27 mg/dL    eGFR 70 >59 mL/min/1.73    Bun-Creatinine Ratio 16 9 - 20    Sodium 140 134 - 144 mmol/L    Potassium 4.2 3.5 - 5.2 mmol/L    Chloride 104 96 - 106 mmol/L    Co2 24 20 - 29 mmol/L    Calcium 9.3 8.7 - 10.2 mg/dL    Total Protein 6.4 6.0 - 8.5 g/dL    Albumin 4.4 4.1 - 5.1 g/dL    Globulin 2.0 1.5 - 4.5 g/dL    Total Bilirubin 0.6 0.0 - 1.2 mg/dL    Alkaline Phosphatase 68 44 - 121 IU/L    AST(SGOT) 23 0 - 40 IU/L    ALT(SGPT) 15 0 - 44 IU/L   LIPID PANEL    Collection Time: 03/31/25  7:31 AM   Result Value Ref Range    Cholesterol,Tot 201 (H) 100 - 199 mg/dL    Triglycerides 79 0 - 149 mg/dL    HDL 71 >39 mg/dL    VLDL Cholesterol Calc 14 5 - 40 mg/dL    LDL Chol Calc (NIH) 116 (H) 0 - 99 mg/dL    LDL  Calc Comment: CANCELED    HEMOGLOBIN A1C    Collection Time: 03/31/25  7:31 AM   Result Value Ref Range    Glycohemoglobin 4.8 4.8 - 5.6 %   VITAMIN D 25-HYDROXY    Collection Time: 03/31/25  7:31 AM   Result Value Ref Range    25-Hydroxy   Vitamin D 25 28.7 (L) 30.0 - 100.0 ng/mL   TSH RFLX TO T4F    Collection Time: 03/31/25  7:31 AM   Result Value Ref Range    TSH 3.960 0.450 - 4.500 uIU/mL      Assessment and Plan:     The following treatment plan was discussed through shared decision making with the patient:    1. Dyslipidemia  Lipid Profile      2. Vitamin D deficiency        3. Inguinal bulge        4. Left inguinal hernia  Referral to General Surgery        Assessment & Plan  1. Left inguinal hernia.  - The ultrasound completed in [facility], confirmed a fat-containing reducible left hernia.  - Advised to avoid heavy lifting and strenuous physical activity to prevent exacerbation.  - Referral to general surgery has been initiated for further evaluation and potential surgical intervention if necessary.  - Discussed the potential risks of hernia progression, including strangulation and necrotic bowel, and the importance of monitoring.    2. Elevated cholesterol.  - Cholesterol levels were slightly elevated, with a 10-year cardiovascular risk calculated at 1.4%.  - Counseled on the importance of a healthy diet, reducing saturated fats and processed carbohydrates, and increasing physical activity.  - A repeat cholesterol panel will be ordered in 6 months to monitor any changes.  - Discussed the option of a CT cardiac score to assess calcifications or plaque buildup in the arteries surrounding the heart if interested.    3. Low blood pressure.  - Reported occasional low blood pressure readings but is asymptomatic.  - Overall health, including normal BMI, electrolyte levels, and kidney function, suggests low blood pressure is likely due to physical fitness and lean body mass.  - No immediate intervention required  unless symptoms develop.  - Reviewed potential causes and reassured that current findings are not concerning.    4. Vitamin D deficiency.  - Vitamin D levels were slightly low.  - Advised to continue taking a vitamin D supplement at a dosage of 4291-7443 units daily.  - Discussed the potential benefits of vitamin D supplementation on energy levels and cell function.      Return in about 1 year (around 5/8/2026) for Annual.       Please note that this note was created using dictation with voice recognition software. I have made every reasonable attempt to correct obvious errors, but I expect that there are errors of grammar and possibly content that I did not discover before finalizing the note.    TANIA Rahman  [facility]  [facility]

## 2025-05-14 NOTE — Clinical Note
REFERRAL APPROVAL NOTICE         Sent on May 14, 2025                   Rony Holt  [address] [facility] [address]                   Dear Mr. Holt,    After a careful review of the medical information and benefit coverage, [facility] has processed your referral. See below for additional details.    If applicable, you must be actively enrolled with your insurance for coverage of the authorized service. If you have any questions regarding your coverage, please contact your insurance directly.    REFERRAL INFORMATION   Referral #:  [id number]  Referred-To Department    Referred-By Provider:  General Surgery    MIKE Rahman   Department Of Surgery      [address]  [facility] [id number] [phone] [address], [address]  [facility] [id number] [phone]    Referral Start Date:  05/08/2025  Referral End Date:   05/08/2026             SCHEDULING  If you do not already have an appointment, please call [phone] to make an appointment.     MORE INFORMATION  If you do not already have a [employer] account, sign up at: [email or web address]  You can access your medical information, make appointments, see lab results, billing information, and more.  If you have questions regarding this referral, please contact  the [facility] Referrals department at:             [phone]. Monday - Friday 8:00AM - 5:00PM.     Sincerely,    [facility]

## 2025-06-18 NOTE — PROGRESS NOTES
Subjective:   6/19/2025 12:57 PM  Primary care physician:MIKE Rahman    Chief Complaint:   Chief Complaint   Patient presents with    New Patient     NP- left inguinal hernia  US- fat containing inguinal hernia        Diagnosis:   1. Left inguinal hernia            History of presenting illness:  Carlos Holt is a pleasant 50 y.o. year old male who presented with a left inguinal hernia.  Patient is currently asymptomatic and discovered the hernia while looking in a mirror.  He denies obstructive signs or symptoms such as nausea or vomiting.      Past Medical History[1]  Past Surgical History[2]  Allergies[3]  Encounter Medications[4]  Social History     Socioeconomic History    Marital status: Legally      Spouse name: Not on file    Number of children: Not on file    Years of education: Not on file    Highest education level: Professional school degree (e.g., MD, DDS, DVM, LETICIA)   Occupational History    Not on file   Tobacco Use    Smoking status: Never    Smokeless tobacco: Never   Vaping Use    Vaping status: Never Used   Substance and Sexual Activity    Alcohol use: Yes     Comment: Socially    Drug use: Never    Sexual activity: Yes     Partners: Female   Other Topics Concern    Not on file   Social History Narrative    Not on file     Social Drivers of Health     Financial Resource Strain: Low Risk  (8/31/2024)    Overall Financial Resource Strain (CARDIA)     Difficulty of Paying Living Expenses: Not hard at all   Food Insecurity: No Food Insecurity (8/31/2024)    Hunger Vital Sign     Worried About Running Out of Food in the Last Year: Never true     Ran Out of Food in the Last Year: Never true   Transportation Needs: No Transportation Needs (8/31/2024)    PRAPARE - Transportation     Lack of Transportation (Medical): No     Lack of Transportation (Non-Medical): No   Physical Activity: Insufficiently Active (8/31/2024)    Exercise Vital Sign     Days of Exercise per Week: 3 days     Minutes  of Exercise per Session: 30 min   Stress: Stress Concern Present (8/31/2024)    [employer] - Occupational Stress Questionnaire     Feeling of Stress : To some extent   Social Connections: Socially Isolated (8/31/2024)    Social Connection and Isolation Panel [NHANES]     Frequency of Communication with Friends and Family: Once a week     Frequency of Social Gatherings with Friends and Family: Never     Attends Yazidism Services: Never     Active Member of Clubs or Organizations: No     Attends Club or Organization Meetings: Never     Marital Status:    Intimate Partner Violence: Not on file   Housing Stability: Low Risk  (8/31/2024)    Housing Stability Vital Sign     Unable to Pay for Housing in the Last Year: No     Number of Times Moved in the Last Year: 1     Homeless in the Last Year: No      Tobacco Use History[5]  Social History     Substance and Sexual Activity   Alcohol Use Yes    Comment: Socially     Social History     Substance and Sexual Activity   Drug Use Never        Family History   Problem Relation Age of Onset    Dementia Mother     Cancer Father         Kidney    Osteoporosis Father     Hypertension Father     Diabetes Paternal Aunt     Dementia Maternal Grandmother     Colorectal Cancer Paternal Grandfather     Breast Cancer Neg Hx     Heart Disease Neg Hx     Hyperlipidemia Neg Hx        Review of Systems   Constitutional:  Negative for chills, fever, malaise/fatigue and weight loss.   Eyes:  Negative for blurred vision.   Respiratory:  Negative for cough, hemoptysis and shortness of breath.    Cardiovascular:  Negative for chest pain and leg swelling.   Gastrointestinal:  Negative for blood in stool, constipation, diarrhea, nausea and vomiting.   Genitourinary:  Negative for dysuria, hematuria and urgency.   Musculoskeletal:  Negative for back pain, falls and joint pain.   Skin:  Negative for rash.   Neurological:  Negative for dizziness, weakness and  "headaches.   Endo/Heme/Allergies: Negative.  Does not bruise/bleed easily.   Psychiatric/Behavioral:  Negative for depression. The patient is not nervous/anxious.         Objective:   /64 (BP Location: Left arm, Patient Position: Sitting, BP Cuff Size: Adult)   Pulse (!) 50   Temp 36.7 °C (98 °F) (Temporal)   Ht 1.803 m (5' 11\")   Wt 80.3 kg (177 lb 0.5 oz)   SpO2 99%   BMI 24.69 kg/m²     Physical Exam  Vitals and nursing note reviewed.   Constitutional:       Appearance: Normal appearance. He is normal weight.   HENT:      Head: Normocephalic and atraumatic.      Nose: Nose normal.      Mouth/Throat:      Mouth: Mucous membranes are moist.   Eyes:      Extraocular Movements: Extraocular movements intact.   Cardiovascular:      Rate and Rhythm: Normal rate.      Pulses: Normal pulses.   Pulmonary:      Effort: Pulmonary effort is normal.      Breath sounds: Normal breath sounds.   Abdominal:      General: Abdomen is flat.      Palpations: Abdomen is soft.   Genitourinary:     Penis: Normal.       Testes: Normal.      Comments: Reducible l;eft inguinal hernia with possible right sided reducible inguinal hernia  Musculoskeletal:         General: Normal range of motion.      Cervical back: Normal range of motion.   Skin:     General: Skin is warm and dry.   Neurological:      Mental Status: He is alert and oriented to person, place, and time.   Psychiatric:         Mood and Affect: Mood normal.         Behavior: Behavior normal.         Thought Content: Thought content normal.         Judgment: Judgment normal.         Labs:   Latest Reference Range & Units 03/31/25 07:31   WBC 3.4 - 10.8 x10E3/uL 4.5   RBC 4.14 - 5.80 x10E6/uL 4.53   Hemoglobin 13.0 - 17.7 g/dL 14.1   Hematocrit 37.5 - 51.0 % 41.7   MCV 79 - 97 fL 92   MCH 26.6 - 33.0 pg 31.1   MCHC 31.5 - 35.7 g/dL 33.8   RDW 11.6 - 15.4 % 12.4   Platelet Count 150 - 450 x10E3/uL 195   Immature Cells  CANCELED   Neutrophils-Polys Not Estab. % 66 "   Neutrophils (Absolute) 1.4 - 7.0 x10E3/uL 3.0   Lymphocytes Not Estab. % 23   Lymphs (Absolute) 0.7 - 3.1 x10E3/uL 1.1   Monocytes Not Estab. % 8   Monos (Absolute) 0.1 - 0.9 x10E3/uL 0.4   Eosinophils Not Estab. % 2   Eos (Absolute) 0.0 - 0.4 x10E3/uL 0.1   Basophils Not Estab. % 1   Baso (Absolute) 0.0 - 0.2 x10E3/uL 0.0   Immature Granulocytes Not Estab. % 0   Immature Granulocytes (abs) 0.0 - 0.1 x10E3/uL 0.0   Nucleated RBC  CANCELED   Comments-Diff  CANCELED   Sodium 134 - 144 mmol/L 140   Potassium 3.5 - 5.2 mmol/L 4.2   Chloride 96 - 106 mmol/L 104   Co2 20 - 29 mmol/L 24   Glucose 70 - 99 mg/dL 100 (H)   Bun 6 - 24 mg/dL 20   Creatinine 0.76 - 1.27 mg/dL 1.25   Bun-Creatinine Ratio 9 - 20  16   Calcium 8.7 - 10.2 mg/dL 9.3   AST(SGOT) 0 - 40 IU/L 23   ALT(SGPT) 0 - 44 IU/L 15   Alkaline Phosphatase 44 - 121 IU/L 68   Total Bilirubin 0.0 - 1.2 mg/dL 0.6   Albumin 4.1 - 5.1 g/dL 4.4   Total Protein 6.0 - 8.5 g/dL 6.4   Globulin 1.5 - 4.5 g/dL 2.0   Glycohemoglobin 4.8 - 5.6 % 4.8   Cholesterol,Tot 100 - 199 mg/dL 201 (H)   Triglycerides 0 - 149 mg/dL 79   HDL >39 mg/dL 71   LDL Chol Calc (NIH) 0 - 99 mg/dL 116 (H)   VLDL Cholesterol Calc 5 - 40 mg/dL 14       Imaging:  US Abdomen 03/07/2025        Diagnosis:     1. Left inguinal hernia                Medical Decision Making:  Today's Assessment / Status / Plan:     50 year old male with asymptomatic left inguinal hernia possible bilateral  Patient was counseled that asymptomatic or minimally symptomatic inguinal hernias can be managed with watchful waiting. The hernias can be reducible or chronically incarcerated. I advised the patient that there is no evidence that physical activity will result in a hernia incarceration or clinical worsening of an existing hernia. Thus, there is no compelling reason for such patients to curtail beneficial physical activities (eg, cardiovascular or aerobic exercises) out of concern for exacerbating the hernia. Patients who  opt for watchful waiting should seek prompt surgical evaluation if they experience new-onset pain or discomfort with certain physical activities or if their hernia becomes acutely incarcerated (for those whose hernias were reducible).  I also advised the patient that a number of randomized trials have compared watchful waiting with surgical repair of inguinal hernias and demonstrated that delaying surgical repair in asymptomatic patients was safe, as acute complications rarely occurred (1.8 emergency operations/1000 patient-years) and the pain scores experienced at one or two years with watchful waiting were not worse than those achieved with surgical repair. However, for 38 percent of patients at three years and about 70 percent of patients at 7 to 10 years, surgical repair was required eventually because symptoms (usually pain) gradually increased over time. This information is particularly important when counselling young patients. The surgical outcomes of delayed repairs were not compromised compared with upfront surgery.  The only nonsurgical therapy for groin hernia in males is a truss. A truss is a strap similar to an athletic supporter with a metal or hard plastic plug positioned to lie over the hernia defect. When applied appropriately, the hard disc or plug exerts pressure to keep the hernia contents in the abdomen. Although the use of a truss may be helpful in certain situations, we generally discourage their use because there is insufficient evidence to prove their efficacy. In addition, inappropriate use of a truss may harm abdominal contents in a hernia sac or complicate subsequent surgical repair.  Kelby Sheehan MD PhD  [facility]  General Colon and Rectal Surgeon  [phone]           [1] History reviewed. No pertinent past medical history.  [2]   Past Surgical History:  Procedure Laterality Date    RHINOPLASTY  2000    TONSILLECTOMY  1980   [3]   Allergies  Allergen Reactions     Penicillins Rash     Rash    [4]   Outpatient Encounter Medications as of 6/19/2025   Medication Sig Dispense Refill    LORazepam (ATIVAN) 1 MG Tab Take 1 mg by mouth every four hours as needed for Anxiety.      LamoTRIgine 50 MG TABLET SR 24 HR Take 100 mg by mouth every day. 180 Tablet 3    sertraline (ZOLOFT) 50 MG Tab Take 1 Tablet by mouth every day. 90 Tablet 3     No facility-administered encounter medications on file as of 6/19/2025.   [5]   Social History  Tobacco Use   Smoking Status Never   Smokeless Tobacco Never

## 2025-06-19 ENCOUNTER — OFFICE VISIT (OUTPATIENT)
Facility: MEDICAL CENTER | Age: 50
End: 2025-06-19
Payer: COMMERCIAL

## 2025-06-19 VITALS
DIASTOLIC BLOOD PRESSURE: 64 MMHG | WEIGHT: 177.03 LBS | TEMPERATURE: 98 F | HEIGHT: 71 IN | SYSTOLIC BLOOD PRESSURE: 104 MMHG | OXYGEN SATURATION: 99 % | BODY MASS INDEX: 24.78 KG/M2 | HEART RATE: 50 BPM

## 2025-06-19 DIAGNOSIS — K40.90 LEFT INGUINAL HERNIA: Primary | ICD-10-CM

## 2025-06-19 PROCEDURE — 3078F DIAST BP <80 MM HG: CPT | Performed by: SURGERY

## 2025-06-19 PROCEDURE — 99203 OFFICE O/P NEW LOW 30 MIN: CPT | Performed by: SURGERY

## 2025-06-19 PROCEDURE — 3074F SYST BP LT 130 MM HG: CPT | Performed by: SURGERY

## 2025-06-19 ASSESSMENT — ENCOUNTER SYMPTOMS
BLURRED VISION: 0
CHILLS: 0
DEPRESSION: 0
BACK PAIN: 0
FALLS: 0
COUGH: 0
BLOOD IN STOOL: 0
BRUISES/BLEEDS EASILY: 0
NAUSEA: 0
HEMOPTYSIS: 0
SHORTNESS OF BREATH: 0
VOMITING: 0
FEVER: 0
HEADACHES: 0
DIZZINESS: 0
WEIGHT LOSS: 0
NERVOUS/ANXIOUS: 0
WEAKNESS: 0
CONSTIPATION: 0
DIARRHEA: 0

## 2025-06-19 ASSESSMENT — FIBROSIS 4 INDEX: FIB4 SCORE: 1.52

## 2025-08-21 DIAGNOSIS — F33.0 MILD EPISODE OF RECURRENT MAJOR DEPRESSIVE DISORDER (HCC): ICD-10-CM

## 2025-08-21 DIAGNOSIS — F41.1 GAD (GENERALIZED ANXIETY DISORDER): ICD-10-CM

## 2025-08-22 RX ORDER — LAMOTRIGINE 50 MG/1
100 TABLET, EXTENDED RELEASE ORAL DAILY
Qty: 180 TABLET | Refills: 3 | Status: SHIPPED | OUTPATIENT
Start: 2025-08-22